# Patient Record
Sex: FEMALE | Race: NATIVE HAWAIIAN OR OTHER PACIFIC ISLANDER | NOT HISPANIC OR LATINO
[De-identification: names, ages, dates, MRNs, and addresses within clinical notes are randomized per-mention and may not be internally consistent; named-entity substitution may affect disease eponyms.]

---

## 2020-01-07 ENCOUNTER — APPOINTMENT (OUTPATIENT)
Dept: ORTHOPEDIC SURGERY | Facility: CLINIC | Age: 65
End: 2020-01-07
Payer: COMMERCIAL

## 2020-01-07 VITALS — WEIGHT: 188 LBS | BODY MASS INDEX: 31.29 KG/M2

## 2020-01-07 VITALS — SYSTOLIC BLOOD PRESSURE: 112 MMHG | HEART RATE: 80 BPM | DIASTOLIC BLOOD PRESSURE: 63 MMHG | HEIGHT: 65 IN

## 2020-01-07 DIAGNOSIS — M19.079 PRIMARY OSTEOARTHRITIS, UNSPECIFIED ANKLE AND FOOT: ICD-10-CM

## 2020-01-07 PROCEDURE — 99203 OFFICE O/P NEW LOW 30 MIN: CPT

## 2020-01-07 PROCEDURE — 73630 X-RAY EXAM OF FOOT: CPT | Mod: RT

## 2021-05-11 ENCOUNTER — APPOINTMENT (OUTPATIENT)
Dept: NEUROLOGY | Facility: CLINIC | Age: 66
End: 2021-05-11
Payer: COMMERCIAL

## 2021-05-11 ENCOUNTER — TRANSCRIPTION ENCOUNTER (OUTPATIENT)
Age: 66
End: 2021-05-11

## 2021-05-11 VITALS — TEMPERATURE: 97.5 F

## 2021-05-11 VITALS — HEART RATE: 69 BPM | SYSTOLIC BLOOD PRESSURE: 134 MMHG | DIASTOLIC BLOOD PRESSURE: 68 MMHG

## 2021-05-11 DIAGNOSIS — G90.3 MULTI-SYSTEM DEGENERATION OF THE AUTONOMIC NERVOUS SYSTEM: ICD-10-CM

## 2021-05-11 PROCEDURE — 99072 ADDL SUPL MATRL&STAF TM PHE: CPT

## 2021-05-11 PROCEDURE — 99205 OFFICE O/P NEW HI 60 MIN: CPT

## 2021-05-12 ENCOUNTER — TRANSCRIPTION ENCOUNTER (OUTPATIENT)
Age: 66
End: 2021-05-12

## 2021-05-25 ENCOUNTER — RESULT REVIEW (OUTPATIENT)
Age: 66
End: 2021-05-25

## 2021-05-25 ENCOUNTER — APPOINTMENT (OUTPATIENT)
Dept: CT IMAGING | Facility: CLINIC | Age: 66
End: 2021-05-25
Payer: COMMERCIAL

## 2021-05-25 ENCOUNTER — OUTPATIENT (OUTPATIENT)
Dept: OUTPATIENT SERVICES | Facility: HOSPITAL | Age: 66
LOS: 1 days | End: 2021-05-25
Payer: COMMERCIAL

## 2021-05-25 DIAGNOSIS — G90.3 MULTI-SYSTEM DEGENERATION OF THE AUTONOMIC NERVOUS SYSTEM: ICD-10-CM

## 2021-05-25 PROCEDURE — 74177 CT ABD & PELVIS W/CONTRAST: CPT

## 2021-05-25 PROCEDURE — 82565 ASSAY OF CREATININE: CPT

## 2021-05-25 PROCEDURE — 71260 CT THORAX DX C+: CPT | Mod: 26

## 2021-05-25 PROCEDURE — 74177 CT ABD & PELVIS W/CONTRAST: CPT | Mod: 26

## 2021-05-25 PROCEDURE — 71260 CT THORAX DX C+: CPT

## 2021-05-28 ENCOUNTER — NON-APPOINTMENT (OUTPATIENT)
Age: 66
End: 2021-05-28

## 2021-06-01 NOTE — DISCUSSION/SUMMARY
[FreeTextEntry1] : In summary, the patient is a 65-year-old right-handed woman with a 2-year history of progressive vertigo and cerebellar atrophy.  The examination was significant for mostly midline ataxia with wide-based gait and some intention tremor, as well as frequent square wave jerks.\par Overall, the examination is not specific for single diagnosis.  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  The more recent MRI does seem to suggest a mild hot-cross bun sign, though it would be early to even see this clearly.  A spinocerebellar ataxia seems less likely without a family history and with decreased reflexes.  She already had a paraneoplastic panel checked, and she is getting regular mammograms.  Nevertheless, I did recommend a CT of the chest, abdomen and pelvis given the relatively recent onset.  Also recommended that her vitamin E levels be checked.  She is already starting physical therapy which I recommended she continue.\par \par I spent approximately 60 minutes with the patient, examining and discussing the above findings and impression.  Follow-up will be in 4 months, sooner if new problems arise.\par \par

## 2021-06-01 NOTE — HISTORY OF PRESENT ILLNESS
[FreeTextEntry1] : The patient is a 65-year-old right-handed woman who comes to be evaluated for progressive cerebellar atrophy and vertigo.\par Her symptoms gradually began around 2019, and have worsened.  She has no changes in her voice or facial expression.  She has no drooling or dysphagia.  She has no trouble turning over in bed.  Her handwriting is worse than it used to be but other activities of daily living are unaffected.  Her balance is poor, and she has fallen twice.  She has no freezing of gait.  She never had tremor.\par Her memory is good, but she does have mild depression anxiety.  She has no history of acting out her dreams, and no stridor at night.  She has no constipation, orthostasis, or urinary issues.  There is no family history of neurologic disease.  She does not drink alcohol.\par \par She has been evaluated by another neurologist.  MRI 7/20/2019 and 2021 showed progressive cerebellar atrophy.  A paraneoplastic panel done in March 2021 was negative.  I reviewed the MRI images, and the MRI from 2021 shows a questionable hot-cross-bun sign as well as possible corpus callosum atrophy.  There was no putaminal rim hyperintensity.

## 2021-09-10 LAB
A-TOCOPHEROL VIT E SERPL-MCNC: 15.8 MG/L
BETA+GAMMA TOCOPHEROL SERPL-MCNC: 0.6 MG/L

## 2021-09-14 ENCOUNTER — APPOINTMENT (OUTPATIENT)
Dept: NEUROLOGY | Facility: CLINIC | Age: 66
End: 2021-09-14

## 2024-01-30 ENCOUNTER — APPOINTMENT (OUTPATIENT)
Dept: PULMONOLOGY | Facility: CLINIC | Age: 69
End: 2024-01-30
Payer: COMMERCIAL

## 2024-01-30 VITALS
SYSTOLIC BLOOD PRESSURE: 118 MMHG | DIASTOLIC BLOOD PRESSURE: 80 MMHG | WEIGHT: 188 LBS | TEMPERATURE: 97.4 F | BODY MASS INDEX: 31.32 KG/M2 | HEART RATE: 72 BPM | OXYGEN SATURATION: 98 % | HEIGHT: 65 IN | RESPIRATION RATE: 18 BRPM

## 2024-01-30 DIAGNOSIS — R06.02 SHORTNESS OF BREATH: ICD-10-CM

## 2024-01-30 DIAGNOSIS — E66.3 OVERWEIGHT: ICD-10-CM

## 2024-01-30 DIAGNOSIS — E11.9 TYPE 2 DIABETES MELLITUS W/OUT COMPLICATIONS: ICD-10-CM

## 2024-01-30 DIAGNOSIS — Z82.49 FAMILY HISTORY OF ISCHEMIC HEART DISEASE AND OTHER DISEASES OF THE CIRCULATORY SYSTEM: ICD-10-CM

## 2024-01-30 DIAGNOSIS — R06.83 SNORING: ICD-10-CM

## 2024-01-30 DIAGNOSIS — G89.29 PAIN IN UNSPECIFIED FOOT: ICD-10-CM

## 2024-01-30 DIAGNOSIS — J30.89 OTHER ALLERGIC RHINITIS: ICD-10-CM

## 2024-01-30 DIAGNOSIS — Z84.89 FAMILY HISTORY OF OTHER SPECIFIED CONDITIONS: ICD-10-CM

## 2024-01-30 DIAGNOSIS — E78.00 PURE HYPERCHOLESTEROLEMIA, UNSPECIFIED: ICD-10-CM

## 2024-01-30 DIAGNOSIS — M79.673 PAIN IN UNSPECIFIED FOOT: ICD-10-CM

## 2024-01-30 PROCEDURE — 94060 EVALUATION OF WHEEZING: CPT

## 2024-01-30 PROCEDURE — 94618 PULMONARY STRESS TESTING: CPT

## 2024-01-30 PROCEDURE — 71046 X-RAY EXAM CHEST 2 VIEWS: CPT

## 2024-01-30 PROCEDURE — 99204 OFFICE O/P NEW MOD 45 MIN: CPT | Mod: 25

## 2024-01-30 RX ORDER — PREDNISONE 10 MG/1
10 TABLET ORAL
Qty: 50 | Refills: 0 | Status: ACTIVE | COMMUNITY
Start: 2024-01-30 | End: 1900-01-01

## 2024-01-30 NOTE — PHYSICAL EXAM
[No Acute Distress] : no acute distress [Normal Oropharynx] : normal oropharynx [Normal Appearance] : normal appearance [No Neck Mass] : no neck mass [Normal S1, S2] : normal s1, s2 [Normal Rate/Rhythm] : normal rate/rhythm [No Murmurs] : no murmurs [No Resp Distress] : no resp distress [No Abnormalities] : no abnormalities [Benign] : benign [Normal Gait] : normal gait [No Clubbing] : no clubbing [No Cyanosis] : no cyanosis [No Edema] : no edema [FROM] : FROM [Normal Color/ Pigmentation] : normal color/ pigmentation [No Focal Deficits] : no focal deficits [Oriented x3] : oriented x3 [Normal Affect] : normal affect [III] : Mallampati Class: III [TextBox_2] : wheelchair [TextBox_68] : I:E ratio 1:3; expiratory wheezes

## 2024-01-30 NOTE — ASSESSMENT
[FreeTextEntry1] : Ms. 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   Her shortness of breath is multifactorial due to: -poor mechanics of breathing -out of shape -overweight -pulmonary disease   -asthma   -?laryngospasm -cardiac disease    -doubtful   Problem 1: asthma- active -full PFTs at next visit -add Xopenex 0.63 via nebulizer at least BID, up to QID prn (1ST) -add Advair 500 1 inhalation BID (2ND), gargle and spit after using -add Prednisone 20 mg x 7 days, 10 mg x 7 days  -Information sheet given to the patient to be reviewed, this medication is never to be used without consulting the prescribing physician. Proper dietary restraint is necessary specifically salt containing foods, if any reaction may occur should be reported.  -Asthma is believed to be caused by inherited (genetic) and environmental factor, but its exact cause is unknown. Asthma may be triggered by allergens, lung infections, or irritants in the air. Asthma triggers are different for each person.  -Inhaler technique reviewed as well as oral hygiene techniques reviewed with patient. Avoidance of cold air, extremes of temperature, rescue inhaler should be used before exercise. Order of medication reviewed with patient. Recommended adequate hydration and use of a cool mist humidifier in the bedroom   Problem 1A: MSA vs ataxia -full PFTs and ANDREE test at next visit -recommended the Breather -recommended to use POWERbreathe Medic Plus IMT (30 reps, 2X per day)  -formal neurologic follow-up    Problem 2: allergies/sinus -add Dymista 1 sniff each nostril BID  -complete blood work to include: asthma panel, food IgE panel, IgE level, eosinophil level, vitamin D level  -Environmental measures for allergies were encouraged including mattress and pillow covers, air purifier, and environmental controls.    Problem 3: ?laryngospasm -asthma Rx as above   Problem 4: ?DEEPA (elevated Mallampati class, poor quality sleep, snoring) -complete home sleep study  -if positive, consider DD, eXciteOSA therapy device, iNAP DEEPA device, or CPAP machine -Sleep apnea is associated with adverse clinical consequences which can affect most organ systems. Cardiovascular disease risk includes arrhythmias, atrial fibrillation, hypertension, coronary artery disease, and stroke. Metabolic disorders include diabetes type 2, non-alcoholic fatty liver disease. Mood disorder especially depression; and cognitive decline especially in the elderly. Associations with chronic reflux/Barretts esophagus some but not all inclusive. -Reasons include arousal consistent with hypopnea; respiratory events most prominent in REM sleep or supine position; therefore sleep staging and body position are important for accurate diagnosis and estimation of AHI.    Problem 5: cardiac disease -recommended to continue to follow up with Cardiologist if needed   Problem 6: poor breathing mechanics -Recommended Raine Dupont and [place]le breathing technique -Proper breathing techniques were reviewed with an emphasis on exhalation. Patient was instructed to breathe in for 1 second and out for 4 seconds. The patient was encouraged to not talk while walking.   Problem 7: overweight/ out of shape -Weight loss, exercise, and diet control were discussed and are highly encouraged. Treatment options are given such as aqua therapy, and contacting a nutritionist. Recommended to use the elliptical, stationary bike, less use of the treadmill.   Problem 8: health maintenance -recommended yearly flu shot after October 15, 2023 -recommended strep pneumonia vaccines: Prevnar-20 vaccine, followed by Pneumo vaccine 23 one year following after 65 years old. -recommended early intervention for Upper Respiratory Infections (URIs) -recommended regular osteoporosis evaluations -recommended early dermatological evaluations -recommended after the age of 50 to the age of 70, colonoscopy every 5 years   F/P in 6-8 weeks. She is encouraged to call with any changes, concerns, or questions

## 2024-01-30 NOTE — REASON FOR VISIT
[Initial] : an initial visit [Spouse] : spouse [TextBox_44] : SOB, chronic cough, active asthma, underlying allergies, ?laryngospasm, ?DEEPA

## 2024-01-30 NOTE — ADDENDUM
[FreeTextEntry1] : Documented by Anne-Marie Chow acting as a scribe for Dr. Juan Ferreira on 01/30/2024. All medical record entries made by the Scribe were at my, Dr. Juan Ferreira's, direction and personally dictated by me on 01/30/2024. I have reviewed the chart and agree that the record accurately reflects my personal performance of the history, physical exam, assessment and plan. I have also personally directed, reviewed, and agree with the discharge instructions.

## 2024-01-30 NOTE — PROCEDURE
[FreeTextEntry1] : CXR (01/30/2024) reveals mild CM s/p at least 4 rib fractures on the right; no evidence of infiltrate or effusion  PFTs revealed normal flows; FEV1 was 1.87L, which is 75.2% of predicted, with no change on BD; normal flow volume loop. PFTs were performed to evaluate for SOB, asthma  6 minute walk test reveals a low saturation of 94% with no evidence of dyspnea or fatigue; walked 97.8  meters

## 2024-01-31 ENCOUNTER — INPATIENT (INPATIENT)
Facility: HOSPITAL | Age: 69
LOS: 0 days | Discharge: LEFT BEFORE DISCHARGE | DRG: 312 | End: 2024-02-01
Attending: INTERNAL MEDICINE | Admitting: STUDENT IN AN ORGANIZED HEALTH CARE EDUCATION/TRAINING PROGRAM
Payer: COMMERCIAL

## 2024-01-31 VITALS
RESPIRATION RATE: 18 BRPM | TEMPERATURE: 98 F | SYSTOLIC BLOOD PRESSURE: 151 MMHG | HEART RATE: 82 BPM | DIASTOLIC BLOOD PRESSURE: 75 MMHG | WEIGHT: 188.94 LBS | OXYGEN SATURATION: 97 % | HEIGHT: 65 IN

## 2024-01-31 DIAGNOSIS — R55 SYNCOPE AND COLLAPSE: ICD-10-CM

## 2024-01-31 LAB
ALBUMIN SERPL ELPH-MCNC: 4.6 G/DL — SIGNIFICANT CHANGE UP (ref 3.3–5)
ALP SERPL-CCNC: 72 U/L — SIGNIFICANT CHANGE UP (ref 40–120)
ALT FLD-CCNC: 15 U/L — SIGNIFICANT CHANGE UP (ref 10–45)
ANION GAP SERPL CALC-SCNC: 12 MMOL/L — SIGNIFICANT CHANGE UP (ref 5–17)
APTT BLD: 30.3 SEC — SIGNIFICANT CHANGE UP (ref 24.5–35.6)
AST SERPL-CCNC: 14 U/L — SIGNIFICANT CHANGE UP (ref 10–40)
BASOPHILS # BLD AUTO: 0.04 K/UL — SIGNIFICANT CHANGE UP (ref 0–0.2)
BASOPHILS NFR BLD AUTO: 0.4 % — SIGNIFICANT CHANGE UP (ref 0–2)
BILIRUB SERPL-MCNC: 0.3 MG/DL — SIGNIFICANT CHANGE UP (ref 0.2–1.2)
BUN SERPL-MCNC: 18 MG/DL — SIGNIFICANT CHANGE UP (ref 7–23)
CALCIUM SERPL-MCNC: 9.6 MG/DL — SIGNIFICANT CHANGE UP (ref 8.4–10.5)
CHLORIDE SERPL-SCNC: 101 MMOL/L — SIGNIFICANT CHANGE UP (ref 96–108)
CO2 SERPL-SCNC: 28 MMOL/L — SIGNIFICANT CHANGE UP (ref 22–31)
CREAT SERPL-MCNC: 0.51 MG/DL — SIGNIFICANT CHANGE UP (ref 0.5–1.3)
EGFR: 103 ML/MIN/1.73M2 — SIGNIFICANT CHANGE UP
EOSINOPHIL # BLD AUTO: 0.07 K/UL — SIGNIFICANT CHANGE UP (ref 0–0.5)
EOSINOPHIL NFR BLD AUTO: 0.6 % — SIGNIFICANT CHANGE UP (ref 0–6)
ETHANOL SERPL-MCNC: <10 MG/DL — SIGNIFICANT CHANGE UP (ref 0–10)
GLUCOSE SERPL-MCNC: 153 MG/DL — HIGH (ref 70–99)
HCT VFR BLD CALC: 42.3 % — SIGNIFICANT CHANGE UP (ref 34.5–45)
HGB BLD-MCNC: 13.2 G/DL — SIGNIFICANT CHANGE UP (ref 11.5–15.5)
IMM GRANULOCYTES NFR BLD AUTO: 1.1 % — HIGH (ref 0–0.9)
INR BLD: 0.98 RATIO — SIGNIFICANT CHANGE UP (ref 0.85–1.18)
LIDOCAIN IGE QN: 40 U/L — SIGNIFICANT CHANGE UP (ref 7–60)
LYMPHOCYTES # BLD AUTO: 1.92 K/UL — SIGNIFICANT CHANGE UP (ref 1–3.3)
LYMPHOCYTES # BLD AUTO: 17.4 % — SIGNIFICANT CHANGE UP (ref 13–44)
MCHC RBC-ENTMCNC: 25.6 PG — LOW (ref 27–34)
MCHC RBC-ENTMCNC: 31.2 GM/DL — LOW (ref 32–36)
MCV RBC AUTO: 82.1 FL — SIGNIFICANT CHANGE UP (ref 80–100)
MONOCYTES # BLD AUTO: 0.67 K/UL — SIGNIFICANT CHANGE UP (ref 0–0.9)
MONOCYTES NFR BLD AUTO: 6.1 % — SIGNIFICANT CHANGE UP (ref 2–14)
NEUTROPHILS # BLD AUTO: 8.24 K/UL — HIGH (ref 1.8–7.4)
NEUTROPHILS NFR BLD AUTO: 74.4 % — SIGNIFICANT CHANGE UP (ref 43–77)
NRBC # BLD: 0 /100 WBCS — SIGNIFICANT CHANGE UP (ref 0–0)
PLATELET # BLD AUTO: 345 K/UL — SIGNIFICANT CHANGE UP (ref 150–400)
POTASSIUM SERPL-MCNC: 4.1 MMOL/L — SIGNIFICANT CHANGE UP (ref 3.5–5.3)
POTASSIUM SERPL-SCNC: 4.1 MMOL/L — SIGNIFICANT CHANGE UP (ref 3.5–5.3)
PROT SERPL-MCNC: 7.3 G/DL — SIGNIFICANT CHANGE UP (ref 6–8.3)
PROTHROM AB SERPL-ACNC: 10.8 SEC — SIGNIFICANT CHANGE UP (ref 9.5–13)
RBC # BLD: 5.15 M/UL — SIGNIFICANT CHANGE UP (ref 3.8–5.2)
RBC # FLD: 15.4 % — HIGH (ref 10.3–14.5)
SODIUM SERPL-SCNC: 141 MMOL/L — SIGNIFICANT CHANGE UP (ref 135–145)
WBC # BLD: 11.06 K/UL — HIGH (ref 3.8–10.5)
WBC # FLD AUTO: 11.06 K/UL — HIGH (ref 3.8–10.5)

## 2024-01-31 PROCEDURE — 73562 X-RAY EXAM OF KNEE 3: CPT | Mod: 26,RT

## 2024-01-31 PROCEDURE — 99223 1ST HOSP IP/OBS HIGH 75: CPT

## 2024-01-31 PROCEDURE — 73030 X-RAY EXAM OF SHOULDER: CPT | Mod: 26,RT

## 2024-01-31 PROCEDURE — 76377 3D RENDER W/INTRP POSTPROCES: CPT | Mod: 26

## 2024-01-31 PROCEDURE — 99285 EMERGENCY DEPT VISIT HI MDM: CPT

## 2024-01-31 PROCEDURE — 70450 CT HEAD/BRAIN W/O DYE: CPT | Mod: 26,MA

## 2024-01-31 PROCEDURE — 70486 CT MAXILLOFACIAL W/O DYE: CPT | Mod: 26,MA

## 2024-01-31 PROCEDURE — 71045 X-RAY EXAM CHEST 1 VIEW: CPT | Mod: 26

## 2024-01-31 PROCEDURE — 72170 X-RAY EXAM OF PELVIS: CPT | Mod: 26

## 2024-01-31 PROCEDURE — 72125 CT NECK SPINE W/O DYE: CPT | Mod: 26,MA

## 2024-01-31 RX ORDER — LEVALBUTEROL HYDROCHLORIDE 0.63 MG/3ML
0.63 SOLUTION RESPIRATORY (INHALATION)
Qty: 4 | Refills: 2 | Status: ACTIVE | COMMUNITY
Start: 2024-01-30 | End: 1900-01-01

## 2024-01-31 RX ORDER — ACETAMINOPHEN 500 MG
1000 TABLET ORAL ONCE
Refills: 0 | Status: COMPLETED | OUTPATIENT
Start: 2024-01-31 | End: 2024-01-31

## 2024-01-31 RX ORDER — TETANUS TOXOID, REDUCED DIPHTHERIA TOXOID AND ACELLULAR PERTUSSIS VACCINE, ADSORBED 5; 2.5; 8; 8; 2.5 [IU]/.5ML; [IU]/.5ML; UG/.5ML; UG/.5ML; UG/.5ML
0.5 SUSPENSION INTRAMUSCULAR ONCE
Refills: 0 | Status: COMPLETED | OUTPATIENT
Start: 2024-01-31 | End: 2024-01-31

## 2024-01-31 RX ORDER — KETOROLAC TROMETHAMINE 30 MG/ML
30 SYRINGE (ML) INJECTION ONCE
Refills: 0 | Status: DISCONTINUED | OUTPATIENT
Start: 2024-01-31 | End: 2024-01-31

## 2024-01-31 RX ORDER — SODIUM CHLORIDE 9 MG/ML
250 INJECTION INTRAMUSCULAR; INTRAVENOUS; SUBCUTANEOUS ONCE
Refills: 0 | Status: COMPLETED | OUTPATIENT
Start: 2024-01-31 | End: 2024-01-31

## 2024-01-31 RX ADMIN — TETANUS TOXOID, REDUCED DIPHTHERIA TOXOID AND ACELLULAR PERTUSSIS VACCINE, ADSORBED 0.5 MILLILITER(S): 5; 2.5; 8; 8; 2.5 SUSPENSION INTRAMUSCULAR at 15:42

## 2024-01-31 RX ADMIN — Medication 400 MILLIGRAM(S): at 15:41

## 2024-01-31 RX ADMIN — SODIUM CHLORIDE 250 MILLILITER(S): 9 INJECTION INTRAMUSCULAR; INTRAVENOUS; SUBCUTANEOUS at 15:41

## 2024-01-31 RX ADMIN — Medication 30 MILLIGRAM(S): at 21:49

## 2024-01-31 NOTE — ED PROVIDER NOTE - CLINICAL SUMMARY MEDICAL DECISION MAKING FREE TEXT BOX
See Attending Note Patient presents to ER s/p unwitnessed fall. Concern for cardiogenic syncope vs infectious etiology that precipitated the fall. Will obtain labs and EKG to rule out metabolic abnormalities and cardiogenic causes of syncope including brugada, qt prolongation, acs, wpw, and arvc. Will obtain CT head and C spine to assess for acute traumatic injuries. Will obtain XRAY right knee and shoulder to rule out acute fractures    See Attending Note

## 2024-01-31 NOTE — ED PROVIDER NOTE - NS ED ROS FT
CONST: no fevers, no chills  EYES: no redness, no vision changes   HENT: no throat pain, no epistaxis  CV: no chest pain, no palpitations     RESP: no shortness of breath, no cough  ABD: no abdominal pain, no vomiting     : no dysuria, no hematuria   MSK: no muscle pain, no joint swelling     NEURO: no headache, no focal weakness or loss of sensation     SKIN:  no rash, (+) scalp abrasion

## 2024-01-31 NOTE — H&P ADULT - HISTORY OF PRESENT ILLNESS
68y F pmh HTN, DM II, ataxia, presents to ED s/p unwitnessed syncopal fall down a flight of stairs. Patient was trying to go down to basement in her house when she fells. Denies prodromal sx but also endorses not having complete recollection on how she fell. (+) LOC, (+) Head strike, no AC use. Ambulates w/walker, was not using at time of fall. Unclear how long down for. Patient was found by her  who called ambulance that brought her to ED for further eval.  Per EMS team, there was blood on scene. Patient complaining of left sided head pain. She is also complaining of right shoulder and right knee pain, which are from her fall 4 days ago on Sunday 1/28/23      ROS: Denies CP, SOB, palpitation, N/V/D, fever, cough, chills, abm pain, recent travel, sick contact, change in bowel and urinary habits     A 10-system ROS was performed and is negative except as noted above and/or in the HPI.

## 2024-01-31 NOTE — H&P ADULT - PROBLEM SELECTOR PLAN 1
- Afebrile, VSS, clinically nontoxic    - Possible etiology: orthostatic vs cardiac dz vs electrolyte abnorm vs neuro dz vs infection   - EKG: NSR  - Orthostatic: Check orthostatic bp, maintain adequate hydration, change position slowly  - Cardiac: trop & BNP wnl, tele monitor asses arrythmia, check echo (ordered) to eval  structural  or valvular abnormalities  - Electrolyte: review of electrolytes largely wnl except hyperglycemia iso DM, will check A1c  will also check tsh  - Neuro:  no focal neuro deficit, CT head neg   - Infection: Mild leukocytosis--could be reactive,  CXR noninfectious,  Ua ordered,  f/u results, low suspicion for infection, monitor off abx for now

## 2024-01-31 NOTE — H&P ADULT - NSHPPHYSICALEXAM_GEN_ALL_CORE
T(C): 37.1 (02-01-24 @ 00:54), Max: 37.1 (01-31-24 @ 21:16)  HR: 68 (02-01-24 @ 00:54) (68 - 89)  BP: 108/65 (02-01-24 @ 00:54) (108/65 - 160/70)  RR: 17 (02-01-24 @ 00:54) (16 - 28)  SpO2: 96% (02-01-24 @ 00:54) (96% - 99%)    CONSTITUTIONAL: Well groomed, no apparent distress  EYES: PERRLA and symmetric, EOMI  ENMT: MMM. Normal dentition  RESP: No respiratory distress, no use of accessory muscles; CTA b/l  CV: +S1S2, RRR, no MRG; no peripheral edema  GI: Soft, NTND, no RGR  MSK: No digital clubbing or cyanosis; normal  ROM x4 extremities   SKIN: left parietal scalp hematoma, ecchymosis Rt scapula, old healing ecchymosis on knee    NEURO: CN II-XII grossly intact; normal reflexes, sensation intact throughout   PSYCH: A+O x 3

## 2024-01-31 NOTE — H&P ADULT - PROBLEM SELECTOR PLAN 4
- Hold home DM meds while inpatient : Emanuel Pierce   - ISS ACHS  - Check FS, adjust insulin accordingly   - DASH/CC diet  - Check A1c

## 2024-01-31 NOTE — ED PROVIDER NOTE - OBJECTIVE STATEMENT
69 yo F with pmh HTN, DM II, ataxia, on ASA 81 mg presents to ER s/p unwitnessed syncopal fall down a flight of stairs prior to arrival. Patient states she remembers attempting to go to the basement but does not have recollection on how she fell. (+) LOC. patient found by her spouse who called ambulance to bring patient to ER. Per EMS team, there was blood on scene. Patient complaining of left sided head pain. She is also complaining of right shoulder and right knee pain, which are from her fall 4 days ago on Sunday 1/28/23.

## 2024-01-31 NOTE — ED PROVIDER NOTE - ATTENDING CONTRIBUTION TO CARE
Patient is 68-year-old female history of ataxia hypertension diabetes high cholesterol here status post syncopal episode fell on a flight of stairs has a left posterior hematoma on a baby aspirin ANO x 3 nonfocal neuroexam and other injuries noted right shoulder pain cannot fully range it x-rays of that had a fall last week with prior hematoma right knee full range of motion x-rays ordered bruise to the back paraspinal analgesia offered, dtap

## 2024-01-31 NOTE — ED PROVIDER NOTE - PHYSICAL EXAMINATION
PRIMARY SURVEY:  AIRWAY: Upper Airway intact.  Trachea midline.  Patient phonating well.  BREATHING:  Breath sounds present bilaterally. (-) crepitation.   CIRCULATION:  Good pulses bilaterally.  capillary refill < 2sec.  DISABILITY:  Patient is awake and alert.  Initial GCS = 15.    SECONDARY SURVEY:  SKIN:  Warm, dry; (-) cyanosis.  HEAD: (+) 4cm left cephalohematoma along the parietal region of scalp. No pulsatile mass on palpation, no active exsanguination.  EYES:  PERRL, EOMI.  FACE:   (-) deformity, (-) crepitance.  (-) wounds.  EARS: (-) hemotympanum.  MOUTH: Teeth occlude normally. (-) signs of trauma   NECK:  (-) paravertebral tenderness, (-) midline tenderness; (-) crepitus, (-) "step-off".  CHEST:  (-) tenderness; (-) crepitus. (+) right sided abrasion near scapula  LUNGS:  (+) breath sounds equal bilaterally.  (-) wheezes, (-) rhonchi, (-) rales.  HEART AND CARDIOVASCULAR:  (-) irregularity; (-) murmur, (-) gallop.  ABDOMEN AND GI:  (-) ecchymosis, (-) abrasion, (-) distention, (-) tenderness, (-) guarding, (-) rebound, (-) rigidity.  SPINE: (-) C/T/L-spine midline tenderness, deformity, step-off, or para vertebral tenderness.  PELVIS:  (-) pelvis tenderness  EXTREMITY:  (+) ecchymosis at right knee, appears old. Able to have full range of motion at right knee though with pain. (+) pain with active range of motion of right shoulder  NEURO AND PSYCH:  GCS 15.  Strength, sensation WNL.  (-) focal neurologic deficits.

## 2024-01-31 NOTE — H&P ADULT - NSHPLABSRESULTS_GEN_ALL_CORE
13.2   11.06 )-----------( 345      ( 31 Jan 2024 15:05 )             42.3       01-31    141  |  101  |  18  ----------------------------<  153<H>  4.1   |  28  |  0.51    Ca    9.6      31 Jan 2024 15:05    TPro  7.3  /  Alb  4.6  /  TBili  0.3  /  DBili  x   /  AST  14  /  ALT  15  /  AlkPhos  72  01-31      Troponin T, High Sensitivity Result: 12: ng/L (01.31.24 @ 15:05)    Pro-Brain Natriuretic Peptide: 86 pg/mL (01.31.24 @ 15:05)      - - - - - - - - - - - - - - - - - - - - - - - - - - - - - - - - - - - - - - - - - - - - - - - - - - - -       EKG PERSONALLY REVIEWED:    NSR 78bpm     IMAGES PERSONALLY REVIEWED:     < from: Xray Chest 1 View AP/PA (01.31.24 @ 17:00) >  INTERPRETATION:  Chronic healed right rib fractures. No focal   consolidation, pleural effusion, or pneumothorax.      < from: Xray Shoulder 2 Views, Right (01.31.24 @ 17:15) >  IMPRESSION:  Old healed posterolateral right 5th-7th rib fracture deformities.    No shoulder girdle fractures, dislocations, or AC separation.  Preserved joint spaces and smooth articular margins.  Maintained subacromial and coracoclavicular spaces.  Generalized osteopenia otherwise no discrete suspicious lytic or blastic   lesions.    No periarticular soft tissue calcifications.    < from: Xray Pelvis AP only (01.31.24 @ 17:16) >  IMPRESSION: No acute fracture or dislocation.      < from: CT Cervical Spine No Cont (01.31.24 @ 15:24) >  IMPRESSION:  CT HEAD: There is no acute intracranial hemorrhage or depressed calvarial   fracture.    CT CERVICAL SPINE: No fracture or acute traumatic malalignment.    CT maxillofacial: Nonspecific prominent soft tissue emphysema in the left   parapharyngeal space and  space. No facial bone fracture.

## 2024-01-31 NOTE — H&P ADULT - NSICDXPASTMEDICALHX_GEN_ALL_CORE_FT
PAST MEDICAL HISTORY:  DM2 (diabetes mellitus, type 2)     History of ataxia     HTN (hypertension)

## 2024-01-31 NOTE — ED PROVIDER NOTE - PROGRESS NOTE DETAILS
MD SOURAV: EKG reviewed, NSR. UA pending. No ICH. Patient had full range of motion of neck. C collar cleared at bedside [phone] - son Liss Barrera PGY3: I received sign out on this patient. I have reassessed patient and agree with the current plan. Will follow-up labs/imaging. Patient TBA for syncope.

## 2024-01-31 NOTE — H&P ADULT - TIME BILLING
- Ordering, reviewing, and interpreting labs, testing, and imaging  - Counselling and educating patient and family regarding interpretation of aforementioned items and plan of care

## 2024-01-31 NOTE — ED ADULT NURSE NOTE - OBJECTIVE STATEMENT
Call regarding: Chhaya palumbo nurse with   [facility]  would like to speak to the nurse regarding:when  Pt is seen at  1/26/17 of with  PCP , will PCP do a viral nasal swab .Please Advise.    Caller: Chhaya  Number to be reached at:  [phone]  Timeframe for callback: 1/26/17      
Patient was sent to [facility] emergency room.  
68 y.o F BIB EMS p/w c/o fall. A+OX4. Per EMS states pt had unwitnessed fall on basement steps causing pt to fall down x8 steps & hitting head (unsure on what). Positive LOC, unknown how long.  found pt at bottom of steps, noted blood on floor, and called EMS.  states pt had x2 falls in prior in past month, pmh ataxia. Upon EMS arrival to scene states had to re-wrap dressing due to bleeding of L occiput laceration/ hematoma.  @ scene. Non-ambulatory at scene. GCS 15. PERRL. NIH 0. On aspirin. C-collar in placed PTA. R shoulder abrasion also noted w/ complaints of R shoulder pain. Recently seen in hosp sunday for fall, R knee abrasion and old bruise noted. PMH HTN, HLD, HTN. No other complaints at this time, safety maintained.

## 2024-01-31 NOTE — H&P ADULT - PROBLEM SELECTOR PLAN 2
S/p unwitnessed fall at home     - Denies CP, SOB, N/V, vision change   - No AC use, (+) head trauma & LOC   - CXR: atraumatic   - CT head/ C-spine/ Maxillofac: no ICH, neg for acute fx or traumatic malalignment  - PRN pain control   - PT consult   - Fall precaution

## 2024-01-31 NOTE — ED ADULT NURSE NOTE - NSFALLHARMRISKINTERV_ED_ALL_ED
Assistance OOB with selected safe patient handling equipment if applicable/Assistance with ambulation/Communicate risk of Fall with Harm to all staff, patient, and family/Monitor gait and stability/Provide visual cue: red socks, yellow wristband, yellow gown, etc/Reinforce activity limits and safety measures with patient and family/Bed in lowest position, wheels locked, appropriate side rails in place/Call bell, personal items and telephone in reach/Instruct patient to call for assistance before getting out of bed/chair/stretcher/Non-slip footwear applied when patient is off stretcher/[place] to call system/Physically safe environment - no spills, clutter or unnecessary equipment/Purposeful Proactive Rounding/Room/bathroom lighting operational, light cord in reach

## 2024-02-01 ENCOUNTER — LABORATORY RESULT (OUTPATIENT)
Age: 69
End: 2024-02-01

## 2024-02-01 ENCOUNTER — TRANSCRIPTION ENCOUNTER (OUTPATIENT)
Age: 69
End: 2024-02-01

## 2024-02-01 VITALS
RESPIRATION RATE: 18 BRPM | HEART RATE: 65 BPM | OXYGEN SATURATION: 95 % | SYSTOLIC BLOOD PRESSURE: 127 MMHG | DIASTOLIC BLOOD PRESSURE: 70 MMHG | TEMPERATURE: 99 F

## 2024-02-01 DIAGNOSIS — R55 SYNCOPE AND COLLAPSE: ICD-10-CM

## 2024-02-01 DIAGNOSIS — Z29.9 ENCOUNTER FOR PROPHYLACTIC MEASURES, UNSPECIFIED: ICD-10-CM

## 2024-02-01 DIAGNOSIS — E11.9 TYPE 2 DIABETES MELLITUS WITHOUT COMPLICATIONS: ICD-10-CM

## 2024-02-01 DIAGNOSIS — I10 ESSENTIAL (PRIMARY) HYPERTENSION: ICD-10-CM

## 2024-02-01 DIAGNOSIS — F41.9 ANXIETY DISORDER, UNSPECIFIED: ICD-10-CM
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
## 2024-02-01 LAB
A1C WITH ESTIMATED AVERAGE GLUCOSE RESULT: 6 % — HIGH (ref 4–5.6)
ANION GAP SERPL CALC-SCNC: 10 MMOL/L — SIGNIFICANT CHANGE UP (ref 5–17)
APPEARANCE UR: CLEAR — SIGNIFICANT CHANGE UP
BILIRUB UR-MCNC: NEGATIVE — SIGNIFICANT CHANGE UP
BUN SERPL-MCNC: 14 MG/DL — SIGNIFICANT CHANGE UP (ref 7–23)
CALCIUM SERPL-MCNC: 8.8 MG/DL — SIGNIFICANT CHANGE UP (ref 8.4–10.5)
CHLORIDE SERPL-SCNC: 104 MMOL/L — SIGNIFICANT CHANGE UP (ref 96–108)
CO2 SERPL-SCNC: 26 MMOL/L — SIGNIFICANT CHANGE UP (ref 22–31)
COLOR SPEC: YELLOW — SIGNIFICANT CHANGE UP
CREAT SERPL-MCNC: 0.51 MG/DL — SIGNIFICANT CHANGE UP (ref 0.5–1.3)
DIFF PNL FLD: NEGATIVE — SIGNIFICANT CHANGE UP
EGFR: 103 ML/MIN/1.73M2 — SIGNIFICANT CHANGE UP
ESTIMATED AVERAGE GLUCOSE: 126 MG/DL — HIGH (ref 68–114)
GLUCOSE BLDC GLUCOMTR-MCNC: 109 MG/DL — HIGH (ref 70–99)
GLUCOSE BLDC GLUCOMTR-MCNC: 130 MG/DL — HIGH (ref 70–99)
GLUCOSE SERPL-MCNC: 122 MG/DL — HIGH (ref 70–99)
GLUCOSE UR QL: >=1000 MG/DL
HCT VFR BLD CALC: 36.7 % — SIGNIFICANT CHANGE UP (ref 34.5–45)
HCV AB S/CO SERPL IA: 0.06 S/CO — SIGNIFICANT CHANGE UP (ref 0–0.99)
HCV AB SERPL-IMP: SIGNIFICANT CHANGE UP
HGB BLD-MCNC: 11.7 G/DL — SIGNIFICANT CHANGE UP (ref 11.5–15.5)
INR BLD: 1.09 RATIO — SIGNIFICANT CHANGE UP (ref 0.85–1.18)
KETONES UR-MCNC: NEGATIVE MG/DL — SIGNIFICANT CHANGE UP
LACTATE SERPL-SCNC: 0.9 MMOL/L — SIGNIFICANT CHANGE UP (ref 0.5–2)
LEUKOCYTE ESTERASE UR-ACNC: NEGATIVE — SIGNIFICANT CHANGE UP
MCHC RBC-ENTMCNC: 26 PG — LOW (ref 27–34)
MCHC RBC-ENTMCNC: 31.9 GM/DL — LOW (ref 32–36)
MCV RBC AUTO: 81.6 FL — SIGNIFICANT CHANGE UP (ref 80–100)
NITRITE UR-MCNC: NEGATIVE — SIGNIFICANT CHANGE UP
NRBC # BLD: 0 /100 WBCS — SIGNIFICANT CHANGE UP (ref 0–0)
PH UR: 6 — SIGNIFICANT CHANGE UP (ref 5–8)
PLATELET # BLD AUTO: 272 K/UL — SIGNIFICANT CHANGE UP (ref 150–400)
POTASSIUM SERPL-MCNC: 3.8 MMOL/L — SIGNIFICANT CHANGE UP (ref 3.5–5.3)
POTASSIUM SERPL-SCNC: 3.8 MMOL/L — SIGNIFICANT CHANGE UP (ref 3.5–5.3)
PROT UR-MCNC: NEGATIVE MG/DL — SIGNIFICANT CHANGE UP
PROTHROM AB SERPL-ACNC: 12 SEC — SIGNIFICANT CHANGE UP (ref 9.5–13)
RBC # BLD: 4.5 M/UL — SIGNIFICANT CHANGE UP (ref 3.8–5.2)
RBC # FLD: 15.5 % — HIGH (ref 10.3–14.5)
SODIUM SERPL-SCNC: 140 MMOL/L — SIGNIFICANT CHANGE UP (ref 135–145)
SP GR SPEC: 1.02 — SIGNIFICANT CHANGE UP (ref 1–1.03)
TSH SERPL-MCNC: 0.46 UIU/ML — SIGNIFICANT CHANGE UP (ref 0.27–4.2)
UROBILINOGEN FLD QL: 0.2 MG/DL — SIGNIFICANT CHANGE UP (ref 0.2–1)
WBC # BLD: 7.23 K/UL — SIGNIFICANT CHANGE UP (ref 3.8–10.5)
WBC # FLD AUTO: 7.23 K/UL — SIGNIFICANT CHANGE UP (ref 3.8–10.5)

## 2024-02-01 PROCEDURE — 93356 MYOCRD STRAIN IMG SPCKL TRCK: CPT

## 2024-02-01 PROCEDURE — 70486 CT MAXILLOFACIAL W/O DYE: CPT | Mod: MA

## 2024-02-01 PROCEDURE — 90471 IMMUNIZATION ADMIN: CPT

## 2024-02-01 PROCEDURE — 84295 ASSAY OF SERUM SODIUM: CPT

## 2024-02-01 PROCEDURE — 83690 ASSAY OF LIPASE: CPT

## 2024-02-01 PROCEDURE — 85025 COMPLETE CBC W/AUTO DIFF WBC: CPT

## 2024-02-01 PROCEDURE — 83605 ASSAY OF LACTIC ACID: CPT

## 2024-02-01 PROCEDURE — 36415 COLL VENOUS BLD VENIPUNCTURE: CPT

## 2024-02-01 PROCEDURE — 81003 URINALYSIS AUTO W/O SCOPE: CPT

## 2024-02-01 PROCEDURE — 84443 ASSAY THYROID STIM HORMONE: CPT

## 2024-02-01 PROCEDURE — 85027 COMPLETE CBC AUTOMATED: CPT

## 2024-02-01 PROCEDURE — 99285 EMERGENCY DEPT VISIT HI MDM: CPT | Mod: 25

## 2024-02-01 PROCEDURE — 70450 CT HEAD/BRAIN W/O DYE: CPT | Mod: MA

## 2024-02-01 PROCEDURE — 71045 X-RAY EXAM CHEST 1 VIEW: CPT

## 2024-02-01 PROCEDURE — 83036 HEMOGLOBIN GLYCOSYLATED A1C: CPT

## 2024-02-01 PROCEDURE — 86803 HEPATITIS C AB TEST: CPT

## 2024-02-01 PROCEDURE — 73030 X-RAY EXAM OF SHOULDER: CPT

## 2024-02-01 PROCEDURE — 82962 GLUCOSE BLOOD TEST: CPT

## 2024-02-01 PROCEDURE — 85018 HEMOGLOBIN: CPT

## 2024-02-01 PROCEDURE — 82435 ASSAY OF BLOOD CHLORIDE: CPT

## 2024-02-01 PROCEDURE — 93306 TTE W/DOPPLER COMPLETE: CPT | Mod: 26

## 2024-02-01 PROCEDURE — 93306 TTE W/DOPPLER COMPLETE: CPT

## 2024-02-01 PROCEDURE — 97161 PT EVAL LOW COMPLEX 20 MIN: CPT

## 2024-02-01 PROCEDURE — 72170 X-RAY EXAM OF PELVIS: CPT

## 2024-02-01 PROCEDURE — 82947 ASSAY GLUCOSE BLOOD QUANT: CPT

## 2024-02-01 PROCEDURE — 76377 3D RENDER W/INTRP POSTPROCES: CPT

## 2024-02-01 PROCEDURE — 82803 BLOOD GASES ANY COMBINATION: CPT

## 2024-02-01 PROCEDURE — 80048 BASIC METABOLIC PNL TOTAL CA: CPT

## 2024-02-01 PROCEDURE — 96374 THER/PROPH/DIAG INJ IV PUSH: CPT

## 2024-02-01 PROCEDURE — 84132 ASSAY OF SERUM POTASSIUM: CPT

## 2024-02-01 PROCEDURE — 96375 TX/PRO/DX INJ NEW DRUG ADDON: CPT

## 2024-02-01 PROCEDURE — 85014 HEMATOCRIT: CPT

## 2024-02-01 PROCEDURE — 90715 TDAP VACCINE 7 YRS/> IM: CPT

## 2024-02-01 PROCEDURE — 73562 X-RAY EXAM OF KNEE 3: CPT

## 2024-02-01 PROCEDURE — 93005 ELECTROCARDIOGRAM TRACING: CPT

## 2024-02-01 PROCEDURE — 72125 CT NECK SPINE W/O DYE: CPT | Mod: MA

## 2024-02-01 PROCEDURE — 82330 ASSAY OF CALCIUM: CPT

## 2024-02-01 PROCEDURE — 80053 COMPREHEN METABOLIC PANEL: CPT

## 2024-02-01 PROCEDURE — 84484 ASSAY OF TROPONIN QUANT: CPT

## 2024-02-01 PROCEDURE — 80307 DRUG TEST PRSMV CHEM ANLYZR: CPT

## 2024-02-01 PROCEDURE — 85730 THROMBOPLASTIN TIME PARTIAL: CPT

## 2024-02-01 PROCEDURE — 83880 ASSAY OF NATRIURETIC PEPTIDE: CPT

## 2024-02-01 PROCEDURE — 85610 PROTHROMBIN TIME: CPT

## 2024-02-01 RX ORDER — INSULIN LISPRO 100/ML
VIAL (ML) SUBCUTANEOUS
Refills: 0 | Status: DISCONTINUED | OUTPATIENT
Start: 2024-02-01 | End: 2024-02-01

## 2024-02-01 RX ORDER — CLONAZEPAM 1 MG
1 TABLET ORAL
Qty: 0 | Refills: 0 | DISCHARGE
Start: 2024-02-01

## 2024-02-01 RX ORDER — ATORVASTATIN CALCIUM 80 MG/1
10 TABLET, FILM COATED ORAL AT BEDTIME
Refills: 0 | Status: DISCONTINUED | OUTPATIENT
Start: 2024-02-01 | End: 2024-02-01

## 2024-02-01 RX ORDER — CLONAZEPAM 1 MG
0.5 TABLET ORAL AT BEDTIME
Refills: 0 | Status: DISCONTINUED | OUTPATIENT
Start: 2024-02-01 | End: 2024-02-01

## 2024-02-01 RX ORDER — SITAGLIPTIN AND METFORMIN HYDROCHLORIDE 500; 50 MG/1; MG/1
1 TABLET, FILM COATED ORAL
Refills: 0 | DISCHARGE

## 2024-02-01 RX ORDER — CLONAZEPAM 1 MG
1 TABLET ORAL
Refills: 0 | DISCHARGE

## 2024-02-01 RX ORDER — LANOLIN ALCOHOL/MO/W.PET/CERES
3 CREAM (GRAM) TOPICAL AT BEDTIME
Refills: 0 | Status: DISCONTINUED | OUTPATIENT
Start: 2024-02-01 | End: 2024-02-01

## 2024-02-01 RX ORDER — DEXTROSE 50 % IN WATER 50 %
12.5 SYRINGE (ML) INTRAVENOUS ONCE
Refills: 0 | Status: DISCONTINUED | OUTPATIENT
Start: 2024-02-01 | End: 2024-02-01

## 2024-02-01 RX ORDER — EMPAGLIFLOZIN 10 MG/1
1 TABLET, FILM COATED ORAL
Refills: 0 | DISCHARGE

## 2024-02-01 RX ORDER — ATORVASTATIN CALCIUM 80 MG/1
1 TABLET, FILM COATED ORAL
Qty: 0 | Refills: 0 | DISCHARGE
Start: 2024-02-01

## 2024-02-01 RX ORDER — INSULIN LISPRO 100/ML
VIAL (ML) SUBCUTANEOUS AT BEDTIME
Refills: 0 | Status: DISCONTINUED | OUTPATIENT
Start: 2024-02-01 | End: 2024-02-01

## 2024-02-01 RX ORDER — SODIUM CHLORIDE 9 MG/ML
1000 INJECTION, SOLUTION INTRAVENOUS
Refills: 0 | Status: DISCONTINUED | OUTPATIENT
Start: 2024-02-01 | End: 2024-02-01

## 2024-02-01 RX ORDER — ASPIRIN/CALCIUM CARB/MAGNESIUM 324 MG
1 TABLET ORAL
Qty: 0 | Refills: 0 | DISCHARGE
Start: 2024-02-01

## 2024-02-01 RX ORDER — DEXTROSE 50 % IN WATER 50 %
25 SYRINGE (ML) INTRAVENOUS ONCE
Refills: 0 | Status: DISCONTINUED | OUTPATIENT
Start: 2024-02-01 | End: 2024-02-01

## 2024-02-01 RX ORDER — ASPIRIN/CALCIUM CARB/MAGNESIUM 324 MG
81 TABLET ORAL DAILY
Refills: 0 | Status: DISCONTINUED | OUTPATIENT
Start: 2024-02-01 | End: 2024-02-01

## 2024-02-01 RX ORDER — SODIUM CHLORIDE 9 MG/ML
1000 INJECTION INTRAMUSCULAR; INTRAVENOUS; SUBCUTANEOUS
Refills: 0 | Status: DISCONTINUED | OUTPATIENT
Start: 2024-02-01 | End: 2024-02-01

## 2024-02-01 RX ORDER — ESCITALOPRAM OXALATE 10 MG/1
1 TABLET, FILM COATED ORAL
Qty: 0 | Refills: 0 | DISCHARGE
Start: 2024-02-01

## 2024-02-01 RX ORDER — ENOXAPARIN SODIUM 100 MG/ML
40 INJECTION SUBCUTANEOUS EVERY 24 HOURS
Refills: 0 | Status: DISCONTINUED | OUTPATIENT
Start: 2024-02-01 | End: 2024-02-01

## 2024-02-01 RX ORDER — ESCITALOPRAM OXALATE 10 MG/1
10 TABLET, FILM COATED ORAL DAILY
Refills: 0 | Status: DISCONTINUED | OUTPATIENT
Start: 2024-02-01 | End: 2024-02-01

## 2024-02-01 RX ORDER — ESCITALOPRAM OXALATE 10 MG/1
1 TABLET, FILM COATED ORAL
Refills: 0 | DISCHARGE

## 2024-02-01 RX ORDER — ATORVASTATIN CALCIUM 80 MG/1
1 TABLET, FILM COATED ORAL
Refills: 0 | DISCHARGE

## 2024-02-01 RX ORDER — ASPIRIN/CALCIUM CARB/MAGNESIUM 324 MG
1 TABLET ORAL
Refills: 0 | DISCHARGE

## 2024-02-01 RX ORDER — GLUCAGON INJECTION, SOLUTION 0.5 MG/.1ML
1 INJECTION, SOLUTION SUBCUTANEOUS ONCE
Refills: 0 | Status: DISCONTINUED | OUTPATIENT
Start: 2024-02-01 | End: 2024-02-01

## 2024-02-01 RX ORDER — DEXTROSE 50 % IN WATER 50 %
15 SYRINGE (ML) INTRAVENOUS ONCE
Refills: 0 | Status: DISCONTINUED | OUTPATIENT
Start: 2024-02-01 | End: 2024-02-01

## 2024-02-01 RX ORDER — ACETAMINOPHEN 500 MG
650 TABLET ORAL EVERY 6 HOURS
Refills: 0 | Status: DISCONTINUED | OUTPATIENT
Start: 2024-02-01 | End: 2024-02-01

## 2024-02-01 RX ORDER — ONDANSETRON 8 MG/1
4 TABLET, FILM COATED ORAL EVERY 8 HOURS
Refills: 0 | Status: DISCONTINUED | OUTPATIENT
Start: 2024-02-01 | End: 2024-02-01

## 2024-02-01 RX ADMIN — Medication 650 MILLIGRAM(S): at 04:01

## 2024-02-01 RX ADMIN — Medication 650 MILLIGRAM(S): at 17:39

## 2024-02-01 RX ADMIN — Medication 81 MILLIGRAM(S): at 13:21

## 2024-02-01 RX ADMIN — Medication 3 MILLIGRAM(S): at 04:00

## 2024-02-01 RX ADMIN — SODIUM CHLORIDE 100 MILLILITER(S): 9 INJECTION INTRAMUSCULAR; INTRAVENOUS; SUBCUTANEOUS at 01:52

## 2024-02-01 RX ADMIN — ESCITALOPRAM OXALATE 10 MILLIGRAM(S): 10 TABLET, FILM COATED ORAL at 13:21

## 2024-02-01 RX ADMIN — Medication 650 MILLIGRAM(S): at 10:12

## 2024-02-01 NOTE — DISCHARGE NOTE NURSING/CASE MANAGEMENT/SOCIAL WORK - NSDCVIVACCINE_GEN_ALL_CORE_FT
Tdap; 31-Jan-2024 15:42; Roxi hewitt (ANDRE); [employer]; [id number] (Exp. Date: 20-Jul-2025); IntraMuscular; Deltoid Left.; 0.5 milliLiter(s); VIS (VIS Published: 09-May-2013, VIS Presented: 31-Jan-2024);

## 2024-02-01 NOTE — ED ADULT NURSE REASSESSMENT NOTE - NS ED NURSE REASSESS COMMENT FT1
Patient family member expressing concerns about care. Patient family member reassured that patient is on telemetry box, and was given pain medication for left shoulder pain. PFCC at bedside. patient family member also discussing signing out AMA, covering provider Francine made aware and to come down. Bed locked and lowered. Comfort and safety measures maitained.

## 2024-02-01 NOTE — PHYSICAL THERAPY INITIAL EVALUATION ADULT - PERSONAL SAFETY AND JUDGMENT, REHAB EVAL
frequent verbal cuing req to maintain proper distance from walker and proper sequencing of AD during amb especially while taking turns./impaired

## 2024-02-01 NOTE — DISCHARGE NOTE PROVIDER - HOSPITAL COURSE
HPI:  68y F pmh HTN, DM II, ataxia, presents to ED s/p unwitnessed syncopal fall down a flight of stairs. Patient was trying to go down to basement in her house when she fells. Denies prodromal sx but also endorses not having complete recollection on how she fell. (+) LOC, (+) Head strike, no AC use. Ambulates w/walker, was not using at time of fall. Unclear how long down for. Patient was found by her  who called ambulance that brought her to ED for further eval.  Per EMS team, there was blood on scene. Patient complaining of left sided head pain. She is also complaining of right shoulder and right knee pain, which are from her fall 4 days ago on Sunday 1/28/23      ROS: Denies CP, SOB, palpitation, N/V/D, fever, cough, chills, abm pain, recent travel, sick contact, change in bowel and urinary habits     A 10-system ROS was performed and is negative except as noted above and/or in the HPI.   (31 Jan 2024 23:30)    Hospital Course: Patient was admitted for further medical management of syncope:. CXR with no focal consolidations. EKG: NSR. CT Head negative for acute pathology, C spine with no fx, CT maxillofacial no fx, soft tissue emphysema in L parapharyngeal space &  space. Patient recommended for cardiac eval, TTE, Orthostatics, and PT eval however patient requesting to leave Against Medical Advice.  After thorough discussion regarding the risks, the patient verbalized understanding that leaving now, without completing the recommended diagnostic and treatment regime, may result in permanent physical injury up to and including death.  Notified  ____ that patient wishes to leave AMA. Medication reconciliation reviewed, revised, and resolved with  ___.         Important Medication Changes and Reason:    Active or Pending Issues Requiring Follow-up:  Follow-up with your primary care physician and cardiology       Advanced Directives:   [ ] Full code  [ ] DNR  [ ] Hospice    Discharge Diagnoses:  Syncope  DM2  HTN           HPI:  68y F pmh HTN, DM II, ataxia, presents to ED s/p unwitnessed syncopal fall down a flight of stairs. Patient was trying to go down to basement in her house when she fells. Denies prodromal sx but also endorses not having complete recollection on how she fell. (+) LOC, (+) Head strike, no AC use. Ambulates w/walker, was not using at time of fall. Unclear how long down for. Patient was found by her  who called ambulance that brought her to ED for further eval.  Per EMS team, there was blood on scene. Patient complaining of left sided head pain. She is also complaining of right shoulder and right knee pain, which are from her fall 4 days ago on Sunday 1/28/23      ROS: Denies CP, SOB, palpitation, N/V/D, fever, cough, chills, abm pain, recent travel, sick contact, change in bowel and urinary habits     A 10-system ROS was performed and is negative except as noted above and/or in the HPI.   (31 Jan 2024 23:30)    Hospital Course: Patient was admitted for further medical management of syncope:. CXR with no focal consolidations. EKG: NSR. CT Head negative for acute pathology, C spine with no fx, CT maxillofacial no fx, soft tissue emphysema in L parapharyngeal space &  space.  Pt's   an MD  wanted to take her on AMA . After talking to card  (who expedited echo)he changed his mind  and waiting for echo to be done. Per card if echo is ok she can be discharged  home later, Attending is aware.       Important Medication Changes and Reason:    Active or Pending Issues Requiring Follow-up:  Follow-up with your primary care physician and cardiology       Advanced Directives:   [ ] Full code  [ ] DNR  [ ] Hospice    Discharge Diagnoses:  Syncope  DM2  HTN           HPI:  68y F pmh HTN, DM II, ataxia, presents to ED s/p unwitnessed syncopal fall down a flight of stairs. Patient was trying to go down to basement in her house when she fells. Denies prodromal sx but also endorses not having complete recollection on how she fell. (+) LOC, (+) Head strike, no AC use. Ambulates w/walker, was not using at time of fall. Unclear how long down for. Patient was found by her  who called ambulance that brought her to ED for further eval.  Per EMS team, there was blood on scene. Patient complaining of left sided head pain. She is also complaining of right shoulder and right knee pain, which are from her fall 4 days ago on Sunday 1/28/23      ROS: Denies CP, SOB, palpitation, N/V/D, fever, cough, chills, abm pain, recent travel, sick contact, change in bowel and urinary habits     A 10-system ROS was performed and is negative except as noted above and/or in the HPI.   (31 Jan 2024 23:30)    Hospital Course: Patient was admitted for further medical management of syncope:. CXR with no focal consolidations. EKG: NSR. CT Head negative for acute pathology, C spine with no fx, CT maxillofacial no fx, soft tissue emphysema in L parapharyngeal space &  space.  Pt's   an MD  wanted to take her on AMA . After talking to card  (who expedited echo)he changed his mind  and waiting for echo to be done. Per card if echo is ok she can be discharged  home later, Attending is aware.   echo result is not there yet,  signed  out AMA.    Important Medication Changes and Reason:    Active or Pending Issues Requiring Follow-up:  Follow-up with your primary care physician and cardiology       Advanced Directives:   [ ] Full code  [ ] DNR  [ ] Hospice    Discharge Diagnoses:  Syncope  DM2  HTN

## 2024-02-01 NOTE — DISCHARGE NOTE PROVIDER - CARE PROVIDER_API CALL
Ema Hurtado  Internal Medicine  [address] [facility] [address]  [place] [address]  Phone: [phone]  Fax: [phone]  Follow Up Time:     Dandre Cardenas  Internal Medicine  [address] [facility], [address]  [place] [address]  Phone: [phone]  Fax: [phone]  Follow Up Time:

## 2024-02-01 NOTE — DISCHARGE NOTE PROVIDER - NSDCCPCAREPLAN_GEN_ALL_CORE_FT
PRINCIPAL DISCHARGE DIAGNOSIS  Diagnosis: Syncope  Assessment and Plan of Treatment: Fainting usually is caused by fear, stress, pain, standing too long, excessive tiredness, elevated temperature, using the bathroom, coughing, or low blood pressure.  Blood pressure can drop if you do not drink enough, are on blood pressure medications, drink alcohol, or experience bleeding.   Avoid activity or condition that causes your syncope.  Lay down with your feet up if you feel like you might faint; breath deeply until symptoms pass.  Consult with your doctor about driving, playing sports, or operating machinery.  If you pass out, call 911 to be taken to the nearest emergency room.  Also call 911 to be taken to the nearest emergency room if you experience dizziness or lightheadedness associated with  severe headache, slurred speech, vision changes, facial asymetry, chest pain, abdominal pain, or back pain.       PRINCIPAL DISCHARGE DIAGNOSIS  Diagnosis: Syncope  Assessment and Plan of Treatment: Fainting usually is caused by fear, stress, pain, standing too long, excessive tiredness, elevated temperature, using the bathroom, coughing, or low blood pressure.  Blood pressure can drop if you do not drink enough, are on blood pressure medications, drink alcohol, or experience bleeding.   Avoid activity or condition that causes your syncope.  Lay down with your feet up if you feel like you might faint; breath deeply until symptoms pass.  Consult with your doctor about driving, playing sports, or operating machinery.  If you pass out, call 911 to be taken to the nearest emergency room.  Also call 911 to be taken to the nearest emergency room if you experience dizziness or lightheadedness associated with  severe headache, slurred speech, vision changes, facial asymetry, chest pain, abdominal pain, or back pain.  Echo done, result pending, seen by card, f/up with card        SECONDARY DISCHARGE DIAGNOSES  Diagnosis: Anxiety  Assessment and Plan of Treatment: stable, cont current home med    Diagnosis: Fall at home  Assessment and Plan of Treatment: seen by PT, recs pending    Diagnosis: DM2 (diabetes mellitus, type 2)  Assessment and Plan of Treatment: controlled, cont current home meds    Diagnosis: HTN (hypertension)  Assessment and Plan of Treatment: controlled, cont current home meds     PRINCIPAL DISCHARGE DIAGNOSIS  Diagnosis: Syncope  Assessment and Plan of Treatment: Fainting usually is caused by fear, stress, pain, standing too long, excessive tiredness, elevated temperature, using the bathroom, coughing, or low blood pressure.  Blood pressure can drop if you do not drink enough, are on blood pressure medications, drink alcohol, or experience bleeding.   Avoid activity or condition that causes your syncope.  Lay down with your feet up if you feel like you might faint; breath deeply until symptoms pass.  Consult with your doctor about driving, playing sports, or operating machinery.  If you pass out, call 911 to be taken to the nearest emergency room.  Also call 911 to be taken to the nearest emergency room if you experience dizziness or lightheadedness associated with  severe headache, slurred speech, vision changes, facial asymetry, chest pain, abdominal pain, or back pain.  Echo done, result pending, seen by card, f/up with card  Signed out AMA. echo result pending.        SECONDARY DISCHARGE DIAGNOSES  Diagnosis: Anxiety  Assessment and Plan of Treatment: stable, cont current home med    Diagnosis: Fall at home  Assessment and Plan of Treatment: seen by PT recs pending    Diagnosis: DM2 (diabetes mellitus, type 2)  Assessment and Plan of Treatment: controlled, cont current home meds    Diagnosis: HTN (hypertension)  Assessment and Plan of Treatment: controlled, cont current home meds

## 2024-02-01 NOTE — DISCHARGE NOTE NURSING/CASE MANAGEMENT/SOCIAL WORK - NSDCPEFALRISK_GEN_ALL_CORE
For information on Fall & Injury Prevention, visit: [email or web address] OR  [email or web address] OR  https://www.cdc.gov/steadi/patient.html

## 2024-02-01 NOTE — ED ADULT NURSE REASSESSMENT NOTE - NS ED NURSE REASSESS COMMENT FT1
Report received from Karsten POWELL. patient resting comfortably in bed at this time, pending bed assignment. A&O x3, family at bedside. on bedside cardiac monitor. VS as documented. Bed locked and lowered. Comfort and safety measures maintained. Report received from Karsten POWELL. patient resting comfortably in bed at this time, pending bed assignment. A&O x3, family at bedside, no questions at this time. on bedside cardiac monitor. Provided with breakfast tray. VS as documented. Bed locked and lowered. Comfort and safety measures maintained.

## 2024-02-01 NOTE — PROGRESS NOTE ADULT - SUBJECTIVE AND OBJECTIVE BOX
SUBJECTIVE / OVERNIGHT EVENTS:          --------------------------------------------------------------------------------------------  LABS:                        11.7   7.23  )-----------( 272      ( 01 Feb 2024 06:04 )             36.7     02-01    140  |  104  |  14  ----------------------------<  122<H>  3.8   |  26  |  0.51    Ca    8.8      01 Feb 2024 06:04    TPro  7.3  /  Alb  4.6  /  TBili  0.3  /  DBili  x   /  AST  14  /  ALT  15  /  AlkPhos  72  01-31    PT/INR - ( 01 Feb 2024 06:04 )   PT: 12.0 sec;   INR: 1.09 ratio         PTT - ( 31 Jan 2024 15:05 )  PTT:30.3 sec  CAPILLARY BLOOD GLUCOSE      POCT Blood Glucose.: 109 mg/dL (01 Feb 2024 07:18)  POCT Blood Glucose.: 169 mg/dL (31 Jan 2024 14:29)        Urinalysis Basic - ( 01 Feb 2024 06:04 )    Color: x / Appearance: x / SG: x / pH: x  Gluc: 122 mg/dL / Ketone: x  / Bili: x / Urobili: x   Blood: x / Protein: x / Nitrite: x   Leuk Esterase: x / RBC: x / WBC x   Sq Epi: x / Non Sq Epi: x / Bacteria: x        RADIOLOGY & ADDITIONAL TESTS:    Imaging Personally Reviewed:  [x] YES  [ ] NO    Consultant(s) Notes Reviewed:  [x] YES  [ ] NO    MEDICATIONS  (STANDING):  aspirin enteric coated 81 milliGRAM(s) Oral daily  atorvastatin 10 milliGRAM(s) Oral at bedtime  dextrose 5%. 1000 milliLiter(s) (100 mL/Hr) IV Continuous <Continuous>  dextrose 5%. 1000 milliLiter(s) (50 mL/Hr) IV Continuous <Continuous>  dextrose 50% Injectable 25 Gram(s) IV Push once  dextrose 50% Injectable 12.5 Gram(s) IV Push once  dextrose 50% Injectable 25 Gram(s) IV Push once  enoxaparin Injectable 40 milliGRAM(s) SubCutaneous every 24 hours  escitalopram 10 milliGRAM(s) Oral daily  glucagon  Injectable 1 milliGRAM(s) IntraMuscular once  insulin lispro (ADMELOG) corrective regimen sliding scale   SubCutaneous three times a day before meals  insulin lispro (ADMELOG) corrective regimen sliding scale   SubCutaneous at bedtime  sodium chloride 0.9%. 1000 milliLiter(s) (100 mL/Hr) IV Continuous <Continuous>    MEDICATIONS  (PRN):  acetaminophen     Tablet .. 650 milliGRAM(s) Oral every 6 hours PRN Temp greater or equal to 38C (100.4F), Mild Pain (1 - 3)  aluminum hydroxide/magnesium hydroxide/simethicone Suspension 30 milliLiter(s) Oral every 4 hours PRN Dyspepsia  clonazePAM  Tablet 0.5 milliGRAM(s) Oral at bedtime PRN for anxiety, insomnia  dextrose Oral Gel 15 Gram(s) Oral once PRN Blood Glucose LESS THAN 70 milliGRAM(s)/deciliter  melatonin 3 milliGRAM(s) Oral at bedtime PRN Insomnia  ondansetron Injectable 4 milliGRAM(s) IV Push every 8 hours PRN Nausea and/or Vomiting      Care Discussed with Consultants/Other Providers [x] YES  [ ] NO    Vital Signs Last 24 Hrs  T(C): 36.8 (01 Feb 2024 07:17), Max: 37.1 (31 Jan 2024 21:16)  T(F): 98.2 (01 Feb 2024 07:17), Max: 98.8 (31 Jan 2024 21:16)  HR: 65 (01 Feb 2024 07:17) (65 - 89)  BP: 114/72 (01 Feb 2024 07:17) (108/65 - 160/70)  BP(mean): 74 (01 Feb 2024 00:54) (74 - 95)  RR: 15 (01 Feb 2024 07:17) (15 - 28)  SpO2: 98% (01 Feb 2024 07:17) (96% - 99%)    Parameters below as of 01 Feb 2024 07:17  Patient On (Oxygen Delivery Method): room air      I&O's Summary         SUBJECTIVE / OVERNIGHT EVENTS:    patient seen and examined earlier in ED  resting comfortably on stretcher  c/o left shoulder pain    --------------------------------------------------------------------------------------------  LABS:                        11.7   7.23  )-----------( 272      ( 01 Feb 2024 06:04 )             36.7     02-01    140  |  104  |  14  ----------------------------<  122<H>  3.8   |  26  |  0.51    Ca    8.8      01 Feb 2024 06:04    TPro  7.3  /  Alb  4.6  /  TBili  0.3  /  DBili  x   /  AST  14  /  ALT  15  /  AlkPhos  72  01-31    PT/INR - ( 01 Feb 2024 06:04 )   PT: 12.0 sec;   INR: 1.09 ratio         PTT - ( 31 Jan 2024 15:05 )  PTT:30.3 sec  CAPILLARY BLOOD GLUCOSE      POCT Blood Glucose.: 109 mg/dL (01 Feb 2024 07:18)  POCT Blood Glucose.: 169 mg/dL (31 Jan 2024 14:29)        Urinalysis Basic - ( 01 Feb 2024 06:04 )    Color: x / Appearance: x / SG: x / pH: x  Gluc: 122 mg/dL / Ketone: x  / Bili: x / Urobili: x   Blood: x / Protein: x / Nitrite: x   Leuk Esterase: x / RBC: x / WBC x   Sq Epi: x / Non Sq Epi: x / Bacteria: x        RADIOLOGY & ADDITIONAL TESTS:    Imaging Personally Reviewed:  [x] YES  [ ] NO    Consultant(s) Notes Reviewed:  [x] YES  [ ] NO    MEDICATIONS  (STANDING):  aspirin enteric coated 81 milliGRAM(s) Oral daily  atorvastatin 10 milliGRAM(s) Oral at bedtime  dextrose 5%. 1000 milliLiter(s) (100 mL/Hr) IV Continuous <Continuous>  dextrose 5%. 1000 milliLiter(s) (50 mL/Hr) IV Continuous <Continuous>  dextrose 50% Injectable 25 Gram(s) IV Push once  dextrose 50% Injectable 12.5 Gram(s) IV Push once  dextrose 50% Injectable 25 Gram(s) IV Push once  enoxaparin Injectable 40 milliGRAM(s) SubCutaneous every 24 hours  escitalopram 10 milliGRAM(s) Oral daily  glucagon  Injectable 1 milliGRAM(s) IntraMuscular once  insulin lispro (ADMELOG) corrective regimen sliding scale   SubCutaneous three times a day before meals  insulin lispro (ADMELOG) corrective regimen sliding scale   SubCutaneous at bedtime  sodium chloride 0.9%. 1000 milliLiter(s) (100 mL/Hr) IV Continuous <Continuous>    MEDICATIONS  (PRN):  acetaminophen     Tablet .. 650 milliGRAM(s) Oral every 6 hours PRN Temp greater or equal to 38C (100.4F), Mild Pain (1 - 3)  aluminum hydroxide/magnesium hydroxide/simethicone Suspension 30 milliLiter(s) Oral every 4 hours PRN Dyspepsia  clonazePAM  Tablet 0.5 milliGRAM(s) Oral at bedtime PRN for anxiety, insomnia  dextrose Oral Gel 15 Gram(s) Oral once PRN Blood Glucose LESS THAN 70 milliGRAM(s)/deciliter  melatonin 3 milliGRAM(s) Oral at bedtime PRN Insomnia  ondansetron Injectable 4 milliGRAM(s) IV Push every 8 hours PRN Nausea and/or Vomiting      Care Discussed with Consultants/Other Providers [x] YES  [ ] NO    Vital Signs Last 24 Hrs  T(C): 36.8 (01 Feb 2024 07:17), Max: 37.1 (31 Jan 2024 21:16)  T(F): 98.2 (01 Feb 2024 07:17), Max: 98.8 (31 Jan 2024 21:16)  HR: 65 (01 Feb 2024 07:17) (65 - 89)  BP: 114/72 (01 Feb 2024 07:17) (108/65 - 160/70)  BP(mean): 74 (01 Feb 2024 00:54) (74 - 95)  RR: 15 (01 Feb 2024 07:17) (15 - 28)  SpO2: 98% (01 Feb 2024 07:17) (96% - 99%)    Parameters below as of 01 Feb 2024 07:17  Patient On (Oxygen Delivery Method): room air      I&O's Summary    PHYSICAL EXAM:  GENERAL: NAD, well-developed, comfortable  HEAD:  Atraumatic, Normocephalic  EYES: EOMI, PERRLA, conjunctiva and sclera clear  NECK: Supple, No JVD  CHEST/LUNG: Clear to auscultation bilaterally; No wheeze  HEART: Regular rate and rhythm; No murmurs, rubs, or gallops  ABDOMEN: Soft, Nontender, Nondistended; Bowel sounds present  NEURO: AAOx3, no focal weakness, 5/5 b/l extremity strength, b/l knee no arthritis, no effusion   EXTREMITIES:  2+ Peripheral Pulses, No clubbing, cyanosis, or edema  SKIN: No rashes or lesions

## 2024-02-01 NOTE — DISCHARGE NOTE NURSING/CASE MANAGEMENT/SOCIAL WORK - PATIENT PORTAL LINK FT
You can access the FollowMyHealth Patient Portal offered by [facility] by registering at the following website: [email or web address]. By joining [facility]’s FollowMyHealth portal, you will also be able to view your health information using other applications (apps) compatible with our system.

## 2024-02-01 NOTE — PHYSICAL THERAPY INITIAL EVALUATION ADULT - PATIENT/FAMILY AGREES WITH PLAN
Subacute Rehab; Pt with limitations in self-care & home management, will benefit from Sub acute rehab prior to D/C home to increase strength, balance and endurance to improve functional mobility to level necessary for safe return home

## 2024-02-01 NOTE — ED ADULT NURSE REASSESSMENT NOTE - NS ED NURSE REASSESS COMMENT FT1
Patient and family member deicided to not AMA due to expedited ECHO. Patient taken to ECHO at this time.

## 2024-02-01 NOTE — PHYSICAL THERAPY INITIAL EVALUATION ADULT - NSPTDISCHREC_GEN_A_CORE
Subacute Rehab; Pt with limitations in self-care & home management, will benefit from Sub acute rehab prior to D/C home to increase strength, balance and endurance to improve functional mobility to level necessary for safe return home/Sub-acute Rehab If pt. to d/c home, would recommend home with PT and assist for all functional mobility. DME- 3:1 pt will require 3:1 commode as patient confined to a single level/single room w/o a bathroom and pt maintains decrease standing tolerance./Sub-acute Rehab

## 2024-02-01 NOTE — DISCHARGE NOTE PROVIDER - NSDCFUSCHEDAPPT_GEN_ALL_CORE_FT
[facility] [address] [facility]  Scheduled Appointment: 03/25/2024    Mary Ross  [facility] [address] [facility]  Scheduled Appointment: 03/25/2024

## 2024-02-01 NOTE — PROGRESS NOTE ADULT - ASSESSMENT
Patient is a 68 year old female with PMHx of Ataxia, DM2 and HTN. Presents to [facility] s/p unwitnessed syncopal fall.    # Syncope:   - Orthostatic vs cardiac dz vs electrolyte abnorm vs neuro dz vs infection   - CXR with no focal consolidations  - EKG: NSR  - CT Head negative for acute pathology, C spine with no fx, CT maxillofacial no fx, soft tissue emphysema in L parapharyngeal space &  space  - X-rays negative   - Echo  - Orthos  - PT eval  - Fall precautions  - Cards consult pending    # DM2:  - HgbA1c  - Continue to monitor blood glucose levels  - Sliding Scale    # HTN:  - Monitor BP  - C/w CV meds    # DVT ppx:  - Lovenox    [employer]

## 2024-02-01 NOTE — PHYSICAL THERAPY INITIAL EVALUATION ADULT - PERTINENT HX OF CURRENT PROBLEM, REHAB EVAL
67 yo F with pmh HTN, DM II, ataxia, on ASA 81 mg presents to ER s/p unwitnessed syncopal fall down a flight of stairs prior to arrival. Patient states she remembers attempting to go to the basement but does not have recollection on how she fell. (+) LOC. patient found by her spouse who called ambulance to bring patient to ER. Per EMS team, there was blood on scene. Patient complaining of left sided head pain. She is also complaining of right shoulder and right knee pain, which are from her fall 4 days ago on Sunday 1/28/23.  CT HEAD: There is no acute intracranial hemorrhage or depressed calvarial   fracture. CT CERVICAL SPINE: No fracture or acute traumatic malalignment.  CT maxillofacial: Nonspecific prominent soft tissue emphysema in the left   parapharyngeal space and  space. No facial bone fracture. Xray Pelvis AP-No acute fracture or dislocation. X ray shoulder R- No acute fx/dislocation. Cardiac: trop & BNP wnl, tele monitor asses arrythmia. EKG: NSR. X ray Knee R- No acute fx/ dislocation.

## 2024-02-01 NOTE — CONSULT NOTE ADULT - SUBJECTIVE AND OBJECTIVE BOX
DATE OF SERVICE: 02-01-24      CHIEF COMPLAINT:Patient is a 68y old  Female who presents with a chief complaint of Syncope (01 Feb 2024 11:46)      HISTORY OF PRESENT ILLNESS:HPI:  68y F pmh HTN, DM II, ataxia, presents to ED s/p unwitnessed syncopal fall down a flight of stairs. Patient was trying to go down to basement in her house when she fells. Denies prodromal sx but also endorses not having complete recollection on how she fell. (+) LOC, (+) Head strike, no AC use. Ambulates w/walker, was not using at time of fall. Unclear how long down for. Patient was found by her  who called ambulance that brought her to ED for further eval.  Per EMS team, there was blood on scene. Patient complaining of left sided head pain. She is also complaining of right shoulder and right knee pain, which are from her fall 4 days ago on Sunday 1/28/23      ROS: Denies CP, SOB, palpitation, N/V/D, fever, cough, chills, abm pain, recent travel, sick contact, change in bowel and urinary habits     A 10-system ROS was performed and is negative except as noted above and/or in the HPI.   (31 Jan 2024 23:30)      PAST MEDICAL & SURGICAL HISTORY:  HTN (hypertension)      DM2 (diabetes mellitus, type 2)      History of ataxia              MEDICATIONS:  aspirin enteric coated 81 milliGRAM(s) Oral daily  enoxaparin Injectable 40 milliGRAM(s) SubCutaneous every 24 hours        acetaminophen     Tablet .. 650 milliGRAM(s) Oral every 6 hours PRN  clonazePAM  Tablet 0.5 milliGRAM(s) Oral at bedtime PRN  escitalopram 10 milliGRAM(s) Oral daily  melatonin 3 milliGRAM(s) Oral at bedtime PRN  ondansetron Injectable 4 milliGRAM(s) IV Push every 8 hours PRN    aluminum hydroxide/magnesium hydroxide/simethicone Suspension 30 milliLiter(s) Oral every 4 hours PRN    atorvastatin 10 milliGRAM(s) Oral at bedtime  dextrose 50% Injectable 25 Gram(s) IV Push once  dextrose 50% Injectable 12.5 Gram(s) IV Push once  dextrose 50% Injectable 25 Gram(s) IV Push once  dextrose Oral Gel 15 Gram(s) Oral once PRN  glucagon  Injectable 1 milliGRAM(s) IntraMuscular once  insulin lispro (ADMELOG) corrective regimen sliding scale   SubCutaneous three times a day before meals  insulin lispro (ADMELOG) corrective regimen sliding scale   SubCutaneous at bedtime    dextrose 5%. 1000 milliLiter(s) IV Continuous <Continuous>  dextrose 5%. 1000 milliLiter(s) IV Continuous <Continuous>  sodium chloride 0.9%. 1000 milliLiter(s) IV Continuous <Continuous>      FAMILY HISTORY:      Non-contributory    SOCIAL HISTORY:    [ ] not a smoker    Allergies    No Known Allergies    Intolerances    	    REVIEW OF SYSTEMS:  CONSTITUTIONAL: No fever  EYES: No eye pain, visual disturbances, or discharge  ENMT:  No difficulty hearing, tinnitus  NECK: No pain or stiffness  RESPIRATORY: No cough, wheezing,  CARDIOVASCULAR: No chest pain, palpitations, passing out, dizziness, or leg swelling  GASTROINTESTINAL:  No nausea, vomiting, diarrhea or constipation. No melena.  GENITOURINARY: No dysuria, hematuria  NEUROLOGICAL: No stroke like symptoms  SKIN: No burning or lesions   ENDOCRINE: No heat or cold intolerance  MUSCULOSKELETAL: No joint pain or swelling  PSYCHIATRIC: No  anxiety, mood swings  HEME/LYMPH: No bleeding gums  ALLERGY AND IMMUNOLOGIC: No hives or eczema	    All other ROS negative    PHYSICAL EXAM:  T(C): 37 (02-01-24 @ 16:58), Max: 37.1 (01-31-24 @ 21:16)  HR: 65 (02-01-24 @ 16:58) (65 - 87)  BP: 127/70 (02-01-24 @ 16:58) (108/65 - 139/53)  RR: 18 (02-01-24 @ 16:58) (15 - 18)  SpO2: 95% (02-01-24 @ 16:58) (93% - 98%)  Wt(kg): --  I&O's Summary    01 Feb 2024 07:01  -  01 Feb 2024 20:44  --------------------------------------------------------  IN: 250 mL / OUT: 0 mL / NET: 250 mL        Appearance: Normal	  HEENT:   Normal oral mucosa, EOMI	  Cardiovascular:  S1 S2, No JVD,    Respiratory: Lungs clear to auscultation	  Psychiatry: Alert  Gastrointestinal:  Soft, Non-tender, + BS	  Skin: No rashes   Neurologic: Non-focal  Extremities:  No edema  Vascular: Peripheral pulses palpable    	    	  	  CARDIAC MARKERS:  Labs personally reviewed by me                                  11.7   7.23  )-----------( 272      ( 01 Feb 2024 06:04 )             36.7     02-01    140  |  104  |  14  ----------------------------<  122<H>  3.8   |  26  |  0.51    Ca    8.8      01 Feb 2024 06:04    TPro  7.3  /  Alb  4.6  /  TBili  0.3  /  DBili  x   /  AST  14  /  ALT  15  /  AlkPhos  72  01-31          EKG: Personally reviewed by me - NSR  Radiology: Personally reviewed by me - CXR clear lungs    TTE CONCLUSIONS:      1. Left ventricular cavity is normal. Left ventricular wall thickness is normal. Left ventricular systolic function is normal with an ejection fraction of 63 % by Moon's method of disks. There are no regional wall motion abnormalities seen.   2. Normal left ventricular diastolic function.   3. Normal right ventricular cavity size, wall thickness, and normal systolic function.   4. No pericardial effusion seen.   5. No prior echocardiogram is available for comparison.          Assessment and Plan:   Patient is a 68 year old female with PMHx of Ataxia, DM2 and HTN. Presents to [facility] s/p unwitnessed syncopal fall.    1.  Syncope:   - Orthostatic vs cardiac dz vs electrolyte abnorm vs neuro dz vs infection   - CXR with no focal consolidations  - EKG: NSR  - Echo unremarkable  - Rec OP long term Holter to r/o occult arrythmia with OPTUM cards    2.  DM2:  - HgbA1c  - Continue to monitor blood glucose levels  - Sliding Scale    3.  HTN:  - Monitor BP  - well controlled off meds    4. DVT ppx:  - Lovenox            Differential diagnosis and plan of care discussed with patient after the evaluation. Counseling on diet, nutritional counseling, weight management, exercise and medication compliance was done.   Advanced care planning/advanced directives discussed with patient/family. DNR status including forceful chest compressions to attempt to restart the heart, ventilator support/artificial breathing, electric shock, artificial nutrition, health care proxy, Molst form all discussed with pt. Pt wishes to consider. More than fifteen minutes spent on discussing advanced directives.           Remington Parsons DO [facility]  Cardiovascular Medicine  [address] [place] [address], [address]  Office [phone]  Available via call/text on Microsoft Teams

## 2024-02-01 NOTE — DISCHARGE NOTE PROVIDER - NSDCFUADDAPPT_GEN_ALL_CORE_FT
APPTS ARE READY TO BE MADE: [x] YES    Best Family or Patient Contact (if needed):    Additional Information about above appointments (if needed):    1:   2:   3:     Other comments or requests:    APPTS ARE READY TO BE MADE: [x] YES    Best Family or Patient Contact (if needed):    Additional Information about above appointments (if needed):    1:   2:   3:     Other comments or requests:   Patient advises they do not want our assistance and prefer to coordinate the non - [facility] appointments on their own. No information was provided to the patient.

## 2024-02-01 NOTE — DISCHARGE NOTE PROVIDER - CARE PROVIDERS DIRECT ADDRESSES
,[email or web address]

## 2024-02-01 NOTE — DISCHARGE NOTE PROVIDER - NSDCMRMEDTOKEN_GEN_ALL_CORE_FT
aspirin 81 mg oral delayed release tablet: 1 tab(s) orally once a day  atorvastatin 10 mg oral tablet: 1 tab(s) orally once a day (at bedtime)  clonazePAM 0.5 mg oral tablet: 1 tab(s) orally once a day (at bedtime) As needed for anxiety, insomnia  escitalopram 10 mg oral tablet: 1 tab(s) orally once a day  Janumet 50 mg-1000 mg oral tablet: 1 tab(s) orally 2 times a day  Jardiance 10 mg oral tablet: 1 tab(s) orally once a day

## 2024-02-01 NOTE — PHYSICAL THERAPY INITIAL EVALUATION ADULT - ADDITIONAL COMMENTS
Patient lives in pvt house with family <10  steps to enter. >10 steps inside. Patient ambulated with rolling walker independent. pt owns no other dme s at home. Pt reports h/o falls.

## 2024-02-02 ENCOUNTER — LABORATORY RESULT (OUTPATIENT)
Age: 69
End: 2024-02-02

## 2024-03-04 PROBLEM — Z87.898 PERSONAL HISTORY OF OTHER SPECIFIED CONDITIONS: Chronic | Status: ACTIVE | Noted: 2024-02-01

## 2024-03-04 PROBLEM — E11.9 TYPE 2 DIABETES MELLITUS WITHOUT COMPLICATIONS: Chronic | Status: ACTIVE | Noted: 2024-02-01

## 2024-03-04 PROBLEM — I10 ESSENTIAL (PRIMARY) HYPERTENSION: Chronic | Status: ACTIVE | Noted: 2024-02-01

## 2024-03-25 ENCOUNTER — APPOINTMENT (OUTPATIENT)
Dept: PULMONOLOGY | Facility: CLINIC | Age: 69
End: 2024-03-25
Payer: COMMERCIAL

## 2024-03-25 DIAGNOSIS — J45.909 UNSPECIFIED ASTHMA, UNCOMPLICATED: ICD-10-CM

## 2024-03-25 PROCEDURE — 99214 OFFICE O/P EST MOD 30 MIN: CPT

## 2024-03-25 RX ORDER — FLUTICASONE PROPIONATE AND SALMETEROL 500; 50 UG/1; UG/1
500-50 POWDER RESPIRATORY (INHALATION)
Qty: 180 | Refills: 1 | Status: ACTIVE | COMMUNITY
Start: 2024-01-30 | End: 1900-01-01

## 2024-03-25 RX ORDER — AZELASTINE HYDROCHLORIDE AND FLUTICASONE PROPIONATE 137; 50 UG/1; UG/1
137-50 SPRAY, METERED NASAL
Qty: 1 | Refills: 3 | Status: ACTIVE | COMMUNITY
Start: 2024-01-30 | End: 1900-01-01

## 2024-03-25 RX ORDER — ALBUTEROL SULFATE 90 UG/1
108 (90 BASE) INHALANT RESPIRATORY (INHALATION)
Qty: 1 | Refills: 3 | Status: ACTIVE | COMMUNITY
Start: 2024-03-25 | End: 1900-01-01

## 2024-03-25 NOTE — ASSESSMENT
[FreeTextEntry1] : Ms. BOYCE is a 68 year old female originally from [place] with a history of retired nurse, DM, arthritis, HLD, MSA (Dx 2020) vs ataxia, prior RADS who now comes to the office for a follow up pulmonary evaluation for SOB, chronic cough, active asthma, underlying allergies, ?laryngospasm, ?DEEPA.   Her shortness of breath is multifactorial due to: -poor mechanics of breathing -out of shape -overweight -pulmonary disease   -asthma   -?laryngospasm -cardiac disease    -doubtful   Problem 1: asthma (controlled) -Continue Advair 500 1 inhalation BID  (rinse and gargle) -Add albuterol inhaler every 6 hrs PRN -Continue Xopenex 0.63 via nebulizer at least BID, up to QID prn PRN -s/p Prednisone 20 mg x 7 days, 10 mg x 7 days (1/30/2024)  -Asthma is believed to be caused by inherited (genetic) and environmental factor, but its exact cause is unknown. Asthma may be triggered by allergens, lung infections, or irritants in the air. Asthma triggers are different for each person.   Problem 1A: MSA vs ataxia -full PFTs and ANDREE test at next visit (unable during this visit due to telehealth) -recommended to use Rippld Medic Plus IMT (30 reps, 2X per day)  -formal neurologic follow-up (scheduled for 4/11/24- Dr. Montiel ([facility])   Problem 2: allergies/sinus -Continue Dymista 1 sniff each nostril BID  -Environmental measures for allergies were encouraged including mattress and pillow covers, air purifier, and environmental controls.    Problem 3: ?laryngospasm -asthma Rx as above   Problem 4: ?DEEPA (elevated Mallampati class, poor quality sleep, snoring) -complete home sleep study (scheduled for 4/11/2024 pickup [place]) -if positive, consider DD, eXciteOSA therapy device, iNAP DEEPA device, or CPAP machine -Sleep apnea is associated with adverse clinical consequences which can affect most organ systems. Cardiovascular disease risk includes arrhythmias, atrial fibrillation, hypertension, coronary artery disease, and stroke. Metabolic disorders include diabetes type 2, non-alcoholic fatty liver disease. Mood disorder especially depression; and cognitive decline especially in the elderly. Associations with chronic reflux/Barretts esophagus some but not all inclusive. -Reasons include arousal consistent with hypopnea; respiratory events most prominent in REM sleep or supine position; therefore sleep staging and body position are important for accurate diagnosis and estimation of AHI.     F/P in 4-6 months with Dr. Ferreira with PFTs She is encouraged to call with any changes, concerns, or questions

## 2024-03-25 NOTE — REASON FOR VISIT
[Follow-Up] : a follow-up visit [TextBox_44] : SOB, chronic cough, active asthma, underlying allergies, ?laryngospasm, ?DEEPA

## 2024-03-25 NOTE — HISTORY OF PRESENT ILLNESS
[Home] : at home, [unfilled] , at the time of the visit. [Medical Office: ([facility])___] : at the medical office located in  [Verbal consent obtained from patient] : the patient, [unfilled] [TextBox_4] :  INITIAL VISIT 1/30/2024 Ms. 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 Her chief complaint is  -she notes a prior Dx of RADS, which resolved on its own. She had a pulmonary and immunology work-up. She intermittently gets coughing spurts, maybe due to PND or bronchospasm. -she notes she started having PND, coughing, itchiness in her throat, and her throat closes/she can't breathe, starting 2-3 months ago. The itchiness hasn't improved, but the PND has. She's also wheezing. The progression of her current Sx is: cough, which triggers itchiness in her throat, and then her airway closes. Her Sx are the worst they've been now. She denies prior URIs or construction in her home -she notes she has Advair, Atrovent, and Flonase. She doesn't believe the inhalers improve her Sx. She denies proper inhaler technique -she tested positive for an allergy to dust mites. She notes intermittent sneezing, usually in the winter -she denies itchy eyes or itchy ears -she denies exercising for the last 3 years -she notes snoring, though it's improved from 2 years ago -she notes poor quality of sleep -she notes waking up fatigued -she notes ability to fall asleep while watching TV -she notes waking up due to nocturia X1-2 -she denies any muscle cramps or spasms  -she notes her senses of smell and taste are stable  -she notes weight is stable   -she denies any headaches, nausea, emesis, fever, chills, sweats, chest pain, chest pressure, palpitations, constipation, diarrhea, vertigo, dysphagia, heartburn, reflux, itchy eyes, itchy ears, leg swelling, leg pain, arthralgias, myalgias, or sour taste in the mouth.   TODAY'S VISIT 3/25/2024 Ms. BOYCE is a 68 year old female originally from [place] with a history of retired nurse, DM, arthritis, HLD, MSA (Dx 2020) vs ataxia, prior RADS presenting to the office today for follow up. Her chief complaint is  -reports her breathing has improved; currently using inhalers daily -reports prednisone helped her breathe -reports she is going to physical therapy for balance 2xweek -reports she has appt for neuro, Dr. Montiel ([facility]) for ataxia on 4/11/2024 -reports she is using nasal sprays for PND  denies any headaches, nausea, vomiting, fever, chills, sweats, chest pain, chest pressure, palpitations, coughing, wheezing, fatigue, constipation, dysphagia, dizziness, leg swelling, leg pain, itchy eyes, itchy ears, heartburn, reflux or sour taste in the mouth.

## 2024-04-11 ENCOUNTER — OUTPATIENT (OUTPATIENT)
Dept: OUTPATIENT SERVICES | Facility: HOSPITAL | Age: 69
LOS: 1 days | End: 2024-04-11
Payer: COMMERCIAL

## 2024-04-11 ENCOUNTER — APPOINTMENT (OUTPATIENT)
Dept: SLEEP CENTER | Facility: CLINIC | Age: 69
End: 2024-04-11
Payer: COMMERCIAL

## 2024-04-11 PROCEDURE — 95800 SLP STDY UNATTENDED: CPT | Mod: 26

## 2024-04-11 PROCEDURE — 95800 SLP STDY UNATTENDED: CPT

## 2024-04-16 ENCOUNTER — APPOINTMENT (OUTPATIENT)
Dept: PULMONOLOGY | Facility: CLINIC | Age: 69
End: 2024-04-16
Payer: COMMERCIAL

## 2024-04-16 DIAGNOSIS — G47.33 OBSTRUCTIVE SLEEP APNEA (ADULT) (PEDIATRIC): ICD-10-CM

## 2024-04-16 PROCEDURE — 99442: CPT

## 2024-04-17 DIAGNOSIS — G47.33 OBSTRUCTIVE SLEEP APNEA (ADULT) (PEDIATRIC): ICD-10-CM

## 2024-09-09 ENCOUNTER — APPOINTMENT (OUTPATIENT)
Dept: PULMONOLOGY | Facility: CLINIC | Age: 69
End: 2024-09-09
Payer: COMMERCIAL

## 2024-09-09 VITALS
BODY MASS INDEX: 29.82 KG/M2 | HEIGHT: 65 IN | SYSTOLIC BLOOD PRESSURE: 124 MMHG | DIASTOLIC BLOOD PRESSURE: 60 MMHG | WEIGHT: 179 LBS | OXYGEN SATURATION: 95 % | TEMPERATURE: 97.2 F | HEART RATE: 85 BPM | RESPIRATION RATE: 16 BRPM

## 2024-09-09 DIAGNOSIS — J45.909 UNSPECIFIED ASTHMA, UNCOMPLICATED: ICD-10-CM

## 2024-09-09 DIAGNOSIS — G47.33 OBSTRUCTIVE SLEEP APNEA (ADULT) (PEDIATRIC): ICD-10-CM

## 2024-09-09 DIAGNOSIS — R06.02 SHORTNESS OF BREATH: ICD-10-CM

## 2024-09-09 DIAGNOSIS — J30.89 OTHER ALLERGIC RHINITIS: ICD-10-CM

## 2024-09-09 DIAGNOSIS — R06.83 SNORING: ICD-10-CM

## 2024-09-09 DIAGNOSIS — E66.3 OVERWEIGHT: ICD-10-CM

## 2024-09-09 PROCEDURE — 94010 BREATHING CAPACITY TEST: CPT

## 2024-09-09 PROCEDURE — 94727 GAS DIL/WSHOT DETER LNG VOL: CPT

## 2024-09-09 PROCEDURE — 95012 NITRIC OXIDE EXP GAS DETER: CPT

## 2024-09-09 PROCEDURE — 94729 DIFFUSING CAPACITY: CPT

## 2024-09-09 PROCEDURE — 99214 OFFICE O/P EST MOD 30 MIN: CPT | Mod: 25

## 2024-09-09 PROCEDURE — ZZZZZ: CPT

## 2024-09-09 NOTE — ADDENDUM
[FreeTextEntry1] :  Documented by Giorgi Aguirre acting as a scribe for Dr. Juan Ferreira on 09/09/2024 .   All medical record entries made by the Scribe were at my, Dr. Juan Ferreira's direction and personally dictated by me on 09/09/2024 . I have reviewed the chart and agree that the record accurately reflects my personal performance of the history, Physical exam, assessment, and plan. I have also personally directed, reviewed, and agree with the discharge instructions.

## 2024-09-09 NOTE — PROCEDURE
[FreeTextEntry1] :  Full PFT reveals mild restrictive dysfunction; FEV1 was 1.68 L which is 73 % of predicted, low-normal lung volumes, normal diffusions, at  20.20 L which is 102% predicted, normal flow volume loop. PFT's for performed to evaluate for SOB.    FENO was 16; a normal value being less than 25. Fractional exhaled nitric oxide (FENO) is regarded as a simple, noninvasive method for assessing eosinophilic airway inflammation. Produced by a variety of cells within the lung, nitric oxide (NO) concentrations are generally low in healthy individuals. However, high concentrations of NO appear to be involved in nonspecific host defense mechanisms and chronic inflammatory diseases such as asthma. The American Thoracic Society (ATS) therefore has strongly recommended using FENO to aid in the assessment, management, and long-term monitoring of eosinophilic airway inflammation and asthma, and for identifying steroid responsive individuals whose chronic respiratory symptoms may be caused by airway inflammation. In their 2011 clinical practice guideline, the ATS emphasizes the importance of using FENO.

## 2024-09-09 NOTE — PHYSICAL EXAM
[No Acute Distress] : no acute distress [Normal Oropharynx] : normal oropharynx [III] : Mallampati Class: III [Normal Appearance] : normal appearance [No Neck Mass] : no neck mass [Normal Rate/Rhythm] : normal rate/rhythm [Normal S1, S2] : normal s1, s2 [No Murmurs] : no murmurs [No Resp Distress] : no resp distress [No Abnormalities] : no abnormalities [Benign] : benign [Normal Gait] : normal gait [No Clubbing] : no clubbing [No Cyanosis] : no cyanosis [No Edema] : no edema [FROM] : FROM [Normal Color/ Pigmentation] : normal color/ pigmentation [No Focal Deficits] : no focal deficits [Oriented x3] : oriented x3 [Normal Affect] : normal affect [TextBox_2] : wheelchair [TextBox_68] : I:E ratio 1:3; expiratory wheezes

## 2024-09-09 NOTE — REASON FOR VISIT
[Spouse] : spouse [Follow-Up] : a follow-up visit [TextBox_44] : SOB, chronic cough, active asthma, (+)underlying allergies, ?laryngospasm, (+)DEEPA

## 2024-09-09 NOTE — RESULTS/DATA
[TextEntry] :  Sleep study (4/11/2024) revealed sleep apnea with an AHI/CAROLINE of 16.7, and a low oxygen saturation of 85%

## 2024-09-09 NOTE — HISTORY OF PRESENT ILLNESS
[TextBox_4] : Ms. 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 Her chief complaint is   she notes feeling well   she notes vision is stable  she notes Her bowels are regular...  she notes sleep has good days and bad days  -she notes walking with help from PT  She notes no new medications, vitamins, or supplements.  -she notes being on Certruline  she notes that her appetite is good..  -she notes that her diet is good   -Patient denies any headaches, nausea, vomiting, fever, chills, sweats, chest pain, chest pressure, palpitations, coughing, wheezing, fatigue, diarrhea, constipation, dysphagia, arthralgias, myalgias, dizziness, leg swelling, leg pain, itchy eyes, itchy ears, dysphonia, heartburn, reflux or sour taste in mouth.

## 2024-09-09 NOTE — ASSESSMENT
[FreeTextEntry1] : Ms. 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   Her shortness of breath is multifactorial due to: -poor mechanics of breathing -out of shape -overweight -pulmonary disease   -asthma   -?laryngospasm -cardiac disease    -doubtful   Problem 1: asthma- active -add Xopenex 0.63 via nebulizer at least BID, up to QID prn (1ST) -add Advair 500 1 inhalation BID (2ND), gargle and spit after using -s/p  Prednisone 20 mg x 7 days, 10 mg x 7 days- 1/2024 -Information sheet given to the patient to be reviewed, this medication is never to be used without consulting the prescribing physician. Proper dietary restraint is necessary specifically salt containing foods, if any reaction may occur should be reported.  -Asthma is believed to be caused by inherited (genetic) and environmental factor, but its exact cause is unknown. Asthma may be triggered by allergens, lung infections, or irritants in the air. Asthma triggers are different for each person.  -Inhaler technique reviewed as well as oral hygiene techniques reviewed with patient. Avoidance of cold air, extremes of temperature, rescue inhaler should be used before exercise. Order of medication reviewed with patient. Recommended adequate hydration and use of a cool mist humidifier in the bedroom   Problem 1A: MSA vs ataxia -full PFTs and ANDREE regularly -recommended the Breather -recommended to use POWERbreathe Medic Plus IMT (30 reps, 2X per day)  -formal neurologic follow-up    Problem 2: allergies/sinus -add Dymista 1 sniff each nostril BID  -complete blood work to include: (+) asthma panel, (-)food IgE panel, (-)IgE level, (-)eosinophil level, (-)vitamin D level  -Environmental measures for allergies were encouraged including mattress and pillow covers, air purifier, and environmental controls.    Problem 3: ?laryngospasm -asthma Rx as above   Problem 4: (+)DEEPA (elevated Mallampati class, poor quality sleep, snoring) -s/p  home sleep study  -DD device- Matthew -if positive, consider DD, eXciteOSA therapy device, iNAP DEEPA device, or CPAP machine -Sleep apnea is associated with adverse clinical consequences which can affect most organ systems. Cardiovascular disease risk includes arrhythmias, atrial fibrillation, hypertension, coronary artery disease, and stroke. Metabolic disorders include diabetes type 2, non-alcoholic fatty liver disease. Mood disorder especially depression; and cognitive decline especially in the elderly. Associations with chronic reflux/Barretts esophagus some but not all inclusive. -Reasons include arousal consistent with hypopnea; respiratory events most prominent in REM sleep or supine position; therefore sleep staging and body position are important for accurate diagnosis and estimation of AHI.    Problem 5: cardiac disease -recommended to continue to follow up with Cardiologist if needed   Problem 6: poor breathing mechanics -Recommended Raine Munoz breathing technique -Proper breathing techniques were reviewed with an emphasis on exhalation. Patient was instructed to breathe in for 1 second and out for 4 seconds. The patient was encouraged to not talk while walking.   Problem 7: overweight/ out of shape -Weight loss, exercise, and diet control were discussed and are highly encouraged. Treatment options are given such as aqua therapy, and contacting a nutritionist. Recommended to use the elliptical, stationary bike, less use of the treadmill.   Problem 8: health maintenance -recommended yearly flu shot after October 15, 2023 -recommended strep pneumonia vaccines: Prevnar-20 vaccine, followed by Pneumo vaccine 23 one year following after 65 years old. -recommended early intervention for Upper Respiratory Infections (URIs) -recommended regular osteoporosis evaluations -recommended early dermatological evaluations -recommended after the age of 50 to the age of 70, colonoscopy every 5 years   F/P in 3-6 months She is encouraged to call with any changes, concerns, or questions

## 2025-01-09 ENCOUNTER — APPOINTMENT (OUTPATIENT)
Dept: PULMONOLOGY | Facility: CLINIC | Age: 70
End: 2025-01-09
Payer: COMMERCIAL

## 2025-01-09 VITALS
HEART RATE: 91 BPM | WEIGHT: 170 LBS | DIASTOLIC BLOOD PRESSURE: 64 MMHG | BODY MASS INDEX: 28.32 KG/M2 | RESPIRATION RATE: 16 BRPM | TEMPERATURE: 93.8 F | SYSTOLIC BLOOD PRESSURE: 118 MMHG | HEIGHT: 65 IN | OXYGEN SATURATION: 97 %

## 2025-01-09 DIAGNOSIS — G47.33 OBSTRUCTIVE SLEEP APNEA (ADULT) (PEDIATRIC): ICD-10-CM

## 2025-01-09 DIAGNOSIS — J45.909 UNSPECIFIED ASTHMA, UNCOMPLICATED: ICD-10-CM

## 2025-01-09 DIAGNOSIS — R06.02 SHORTNESS OF BREATH: ICD-10-CM

## 2025-01-09 DIAGNOSIS — J30.89 OTHER ALLERGIC RHINITIS: ICD-10-CM

## 2025-01-09 DIAGNOSIS — R06.83 SNORING: ICD-10-CM

## 2025-01-09 DIAGNOSIS — E66.3 OVERWEIGHT: ICD-10-CM

## 2025-01-09 PROCEDURE — 94010 BREATHING CAPACITY TEST: CPT

## 2025-01-09 PROCEDURE — 99214 OFFICE O/P EST MOD 30 MIN: CPT | Mod: 25

## 2025-01-09 PROCEDURE — 94799 UNLISTED PULMONARY SVC/PX: CPT

## 2025-06-25 ENCOUNTER — APPOINTMENT (OUTPATIENT)
Dept: UROLOGY | Facility: CLINIC | Age: 70
End: 2025-06-25
Payer: COMMERCIAL

## 2025-06-25 ENCOUNTER — NON-APPOINTMENT (OUTPATIENT)
Age: 70
End: 2025-06-25

## 2025-06-25 VITALS — SYSTOLIC BLOOD PRESSURE: 121 MMHG | HEART RATE: 80 BPM | DIASTOLIC BLOOD PRESSURE: 72 MMHG

## 2025-06-25 LAB
APPEARANCE: CLEAR
BACTERIA: NEGATIVE /HPF
BILIRUBIN URINE: NEGATIVE
BLOOD URINE: NEGATIVE
CAST: 0 /LPF
COLOR: YELLOW
EPITHELIAL CELLS: 2 /HPF
GLUCOSE QUALITATIVE U: >=1000 MG/DL
KETONES URINE: NEGATIVE MG/DL
LEUKOCYTE ESTERASE URINE: NEGATIVE
MICROSCOPIC-UA: NORMAL
NITRITE URINE: NEGATIVE
PH URINE: 6
PROTEIN URINE: NEGATIVE MG/DL
RED BLOOD CELLS URINE: 0 /HPF
SPECIFIC GRAVITY URINE: >1.03
UROBILINOGEN URINE: 0.2 MG/DL
WHITE BLOOD CELLS URINE: 0 /HPF

## 2025-06-25 PROCEDURE — 99205 OFFICE O/P NEW HI 60 MIN: CPT

## 2025-06-25 PROCEDURE — 99459 PELVIC EXAMINATION: CPT

## 2025-06-27 LAB
BACTERIA UR CULT: NORMAL
URINE CYTOLOGY: NORMAL

## 2025-07-07 ENCOUNTER — APPOINTMENT (OUTPATIENT)
Dept: PULMONOLOGY | Facility: CLINIC | Age: 70
End: 2025-07-07

## 2025-07-21 ENCOUNTER — APPOINTMENT (OUTPATIENT)
Dept: UROLOGY | Facility: CLINIC | Age: 70
End: 2025-07-21
Payer: COMMERCIAL

## 2025-07-21 ENCOUNTER — OUTPATIENT (OUTPATIENT)
Dept: OUTPATIENT SERVICES | Facility: HOSPITAL | Age: 70
LOS: 1 days | End: 2025-07-21
Payer: COMMERCIAL

## 2025-07-21 DIAGNOSIS — R35.0 FREQUENCY OF MICTURITION: ICD-10-CM

## 2025-07-21 DIAGNOSIS — G90.3 MULTI-SYSTEM DEGENERATION OF THE AUTONOMIC NERVOUS SYSTEM: ICD-10-CM

## 2025-07-21 DIAGNOSIS — E11.9 TYPE 2 DIABETES MELLITUS W/OUT COMPLICATIONS: ICD-10-CM

## 2025-07-21 DIAGNOSIS — G23.8 MULTI-SYSTEM DEGENERATION OF THE AUTONOMIC NERVOUS SYSTEM: ICD-10-CM

## 2025-07-21 PROCEDURE — 51741 ELECTRO-UROFLOWMETRY FIRST: CPT | Mod: 26

## 2025-07-21 PROCEDURE — 51784 ANAL/URINARY MUSCLE STUDY: CPT | Mod: 26

## 2025-07-21 PROCEDURE — 51797 INTRAABDOMINAL PRESSURE TEST: CPT | Mod: 26

## 2025-07-21 PROCEDURE — 51728 CYSTOMETROGRAM W/VP: CPT | Mod: 26

## 2025-07-21 PROCEDURE — 51797 INTRAABDOMINAL PRESSURE TEST: CPT

## 2025-07-21 PROCEDURE — 52000 CYSTOURETHROSCOPY: CPT

## 2025-07-21 PROCEDURE — 51728 CYSTOMETROGRAM W/VP: CPT

## 2025-07-21 PROCEDURE — 51784 ANAL/URINARY MUSCLE STUDY: CPT

## 2025-07-21 PROCEDURE — 51741 ELECTRO-UROFLOWMETRY FIRST: CPT

## 2025-07-22 DIAGNOSIS — E11.9 TYPE 2 DIABETES MELLITUS WITHOUT COMPLICATIONS: ICD-10-CM

## 2025-07-22 DIAGNOSIS — G90.3 MULTI-SYSTEM DEGENERATION OF THE AUTONOMIC NERVOUS SYSTEM: ICD-10-CM

## 2025-07-22 DIAGNOSIS — R32 UNSPECIFIED URINARY INCONTINENCE: ICD-10-CM

## 2025-08-06 ENCOUNTER — APPOINTMENT (OUTPATIENT)
Dept: PULMONOLOGY | Facility: CLINIC | Age: 70
End: 2025-08-06
Payer: COMMERCIAL

## 2025-08-06 VITALS
HEIGHT: 65 IN | RESPIRATION RATE: 16 BRPM | HEART RATE: 88 BPM | OXYGEN SATURATION: 95 % | BODY MASS INDEX: 28.32 KG/M2 | TEMPERATURE: 95.7 F | WEIGHT: 170 LBS | SYSTOLIC BLOOD PRESSURE: 124 MMHG | DIASTOLIC BLOOD PRESSURE: 60 MMHG

## 2025-08-06 DIAGNOSIS — R06.83 SNORING: ICD-10-CM

## 2025-08-06 DIAGNOSIS — E66.3 OVERWEIGHT: ICD-10-CM

## 2025-08-06 DIAGNOSIS — G47.33 OBSTRUCTIVE SLEEP APNEA (ADULT) (PEDIATRIC): ICD-10-CM

## 2025-08-06 DIAGNOSIS — J30.89 OTHER ALLERGIC RHINITIS: ICD-10-CM

## 2025-08-06 DIAGNOSIS — R06.02 SHORTNESS OF BREATH: ICD-10-CM

## 2025-08-06 DIAGNOSIS — J45.40 MODERATE PERSISTENT ASTHMA, UNCOMPLICATED: ICD-10-CM

## 2025-08-06 DIAGNOSIS — J45.909 UNSPECIFIED ASTHMA, UNCOMPLICATED: ICD-10-CM

## 2025-08-06 PROCEDURE — 94729 DIFFUSING CAPACITY: CPT

## 2025-08-06 PROCEDURE — 94010 BREATHING CAPACITY TEST: CPT

## 2025-08-06 PROCEDURE — ZZZZZ: CPT

## 2025-08-06 PROCEDURE — 94799 UNLISTED PULMONARY SVC/PX: CPT

## 2025-08-06 PROCEDURE — 95012 NITRIC OXIDE EXP GAS DETER: CPT

## 2025-08-06 PROCEDURE — 99214 OFFICE O/P EST MOD 30 MIN: CPT | Mod: 25

## 2025-08-06 PROCEDURE — 94727 GAS DIL/WSHOT DETER LNG VOL: CPT

## 2025-08-08 ENCOUNTER — APPOINTMENT (OUTPATIENT)
Dept: UROLOGY | Facility: CLINIC | Age: 70
End: 2025-08-08
Payer: COMMERCIAL

## 2025-08-08 DIAGNOSIS — R32 UNSPECIFIED URINARY INCONTINENCE: ICD-10-CM

## 2025-08-08 PROCEDURE — 51798 US URINE CAPACITY MEASURE: CPT

## 2025-08-08 PROCEDURE — 99213 OFFICE O/P EST LOW 20 MIN: CPT | Mod: 25

## 2025-08-08 RX ORDER — ALFUZOSIN HYDROCHLORIDE 10 MG/1
10 TABLET, EXTENDED RELEASE ORAL
Qty: 30 | Refills: 2 | Status: ACTIVE | COMMUNITY
Start: 2025-07-21 | End: 1900-01-01

## 2025-09-02 ENCOUNTER — APPOINTMENT (OUTPATIENT)
Dept: UROLOGY | Facility: CLINIC | Age: 70
End: 2025-09-02
Payer: COMMERCIAL

## 2025-09-02 VITALS
HEART RATE: 81 BPM | SYSTOLIC BLOOD PRESSURE: 115 MMHG | HEIGHT: 65 IN | OXYGEN SATURATION: 98 % | RESPIRATION RATE: 16 BRPM | WEIGHT: 170 LBS | BODY MASS INDEX: 28.32 KG/M2 | DIASTOLIC BLOOD PRESSURE: 69 MMHG

## 2025-09-02 DIAGNOSIS — R32 UNSPECIFIED URINARY INCONTINENCE: ICD-10-CM

## 2025-09-02 DIAGNOSIS — N32.81 OVERACTIVE BLADDER: ICD-10-CM

## 2025-09-02 PROCEDURE — 99213 OFFICE O/P EST LOW 20 MIN: CPT | Mod: 25

## 2025-09-02 PROCEDURE — 51798 US URINE CAPACITY MEASURE: CPT
